# Patient Record
Sex: FEMALE | Race: WHITE | Employment: UNEMPLOYED | URBAN - METROPOLITAN AREA
[De-identification: names, ages, dates, MRNs, and addresses within clinical notes are randomized per-mention and may not be internally consistent; named-entity substitution may affect disease eponyms.]

---

## 2017-10-26 PROCEDURE — 87624 HPV HI-RISK TYP POOLED RSLT: CPT | Performed by: FAMILY MEDICINE

## 2017-10-26 PROCEDURE — 88175 CYTOPATH C/V AUTO FLUID REDO: CPT | Performed by: FAMILY MEDICINE

## 2020-01-12 PROBLEM — F43.9 SITUATIONAL STRESS: Status: ACTIVE | Noted: 2020-01-12

## 2020-01-12 PROBLEM — Z82.49 FAMILY HISTORY OF EARLY CAD: Status: ACTIVE | Noted: 2020-01-12

## 2024-11-26 ENCOUNTER — ANESTHESIA (OUTPATIENT)
Dept: ENDOSCOPY | Facility: HOSPITAL | Age: 54
DRG: 193 | End: 2024-11-26
Payer: COMMERCIAL

## 2024-11-26 ENCOUNTER — ANESTHESIA EVENT (OUTPATIENT)
Dept: ENDOSCOPY | Facility: HOSPITAL | Age: 54
DRG: 193 | End: 2024-11-26
Payer: COMMERCIAL

## 2024-11-26 ENCOUNTER — HOSPITAL ENCOUNTER (INPATIENT)
Facility: HOSPITAL | Age: 54
LOS: 4 days | Discharge: HOME | DRG: 193 | End: 2024-11-30
Attending: INTERNAL MEDICINE | Admitting: INTERNAL MEDICINE
Payer: COMMERCIAL

## 2024-11-26 ENCOUNTER — APPOINTMENT (OUTPATIENT)
Dept: GENERAL RADIOLOGY | Facility: HOSPITAL | Age: 54
DRG: 193 | End: 2024-11-26
Attending: EMERGENCY MEDICINE
Payer: COMMERCIAL

## 2024-11-26 PROBLEM — J96.90 RESPIRATORY FAILURE (HCC): Status: ACTIVE | Noted: 2024-11-26

## 2024-11-26 LAB
ADENOVIRUS PCR:: NOT DETECTED
ALBUMIN SERPL-MCNC: 4.4 G/DL (ref 3.2–4.8)
ALBUMIN/GLOB SERPL: 1.9 {RATIO} (ref 1–2)
ALP LIVER SERPL-CCNC: 91 U/L
ALT SERPL-CCNC: 15 U/L
ANION GAP SERPL CALC-SCNC: 8 MMOL/L (ref 0–18)
AST SERPL-CCNC: 27 U/L (ref ?–34)
B PARAPERT DNA SPEC QL NAA+PROBE: NOT DETECTED
B PERT DNA SPEC QL NAA+PROBE: NOT DETECTED
B-HCG SERPL-ACNC: <2.6 MIU/ML
BASOPHILS NFR BRONCH: 0 %
BILIRUB SERPL-MCNC: 0.8 MG/DL (ref 0.3–1.2)
BUN BLD-MCNC: 11 MG/DL (ref 9–23)
C PNEUM DNA SPEC QL NAA+PROBE: NOT DETECTED
CALCIUM BLD-MCNC: 9.9 MG/DL (ref 8.7–10.4)
CHLORIDE SERPL-SCNC: 105 MMOL/L (ref 98–112)
CO2 SERPL-SCNC: 24 MMOL/L (ref 21–32)
COLOR FLD: COLORLESS
CORONAVIRUS 229E PCR:: NOT DETECTED
CORONAVIRUS HKU1 PCR:: NOT DETECTED
CORONAVIRUS NL63 PCR:: NOT DETECTED
CORONAVIRUS OC43 PCR:: NOT DETECTED
CREAT BLD-MCNC: 0.82 MG/DL
EGFRCR SERPLBLD CKD-EPI 2021: 86 ML/MIN/1.73M2 (ref 60–?)
EOSINOPHIL NFR BRONCH: 2 %
ERYTHROCYTE [DISTWIDTH] IN BLOOD BY AUTOMATED COUNT: 12.1 %
EST. AVERAGE GLUCOSE BLD GHB EST-MCNC: 123 MG/DL (ref 68–126)
FLUAV RNA SPEC QL NAA+PROBE: NOT DETECTED
FLUBV RNA SPEC QL NAA+PROBE: NOT DETECTED
GLOBULIN PLAS-MCNC: 2.3 G/DL (ref 2–3.5)
GLUCOSE BLD-MCNC: 108 MG/DL (ref 70–99)
GLUCOSE BLD-MCNC: 97 MG/DL (ref 70–99)
GLUCOSE BLD-MCNC: 99 MG/DL (ref 70–99)
HBA1C MFR BLD: 5.9 % (ref ?–5.7)
HCT VFR BLD AUTO: 44.3 %
HGB BLD-MCNC: 14.8 G/DL
INR BLD: 0.98 (ref 0.8–1.2)
L PNEUMO AG UR QL: NEGATIVE
LYMPHOCYTES NFR BRONCH: 64 %
MCH RBC QN AUTO: 31.4 PG (ref 26–34)
MCHC RBC AUTO-ENTMCNC: 33.4 G/DL (ref 31–37)
MCV RBC AUTO: 94.1 FL
METAPNEUMOVIRUS PCR:: NOT DETECTED
MONOS+MACROS NFR BRONCH: 28 %
MRSA DNA SPEC QL NAA+PROBE: NEGATIVE
MYCOPLASMA PNEUMONIA PCR:: NOT DETECTED
NEUTROPHILS NFR BRONCH: 6 %
NT-PROBNP SERPL-MCNC: <35 PG/ML (ref ?–125)
OSMOLALITY SERPL CALC.SUM OF ELEC: 283 MOSM/KG (ref 275–295)
PARAINFLUENZA 1 PCR:: NOT DETECTED
PARAINFLUENZA 2 PCR:: NOT DETECTED
PARAINFLUENZA 3 PCR:: NOT DETECTED
PARAINFLUENZA 4 PCR:: NOT DETECTED
PLATELET # BLD AUTO: 262 10(3)UL (ref 150–450)
POTASSIUM SERPL-SCNC: 4.5 MMOL/L (ref 3.5–5.1)
PROCALCITONIN SERPL-MCNC: <0.04 NG/ML (ref ?–0.05)
PROT SERPL-MCNC: 6.7 G/DL (ref 5.7–8.2)
PROTHROMBIN TIME: 13 SECONDS (ref 11.6–14.8)
RBC # BLD AUTO: 4.71 X10(6)UL
RHINOVIRUS/ENTERO PCR:: NOT DETECTED
RSV RNA SPEC QL NAA+PROBE: NOT DETECTED
SARS-COV-2 RNA NPH QL NAA+NON-PROBE: NOT DETECTED
SODIUM SERPL-SCNC: 137 MMOL/L (ref 136–145)
STREP PNEUMO ANTIGEN, URINE: NEGATIVE
TOTAL CELLS COUNTED FLD: 100
TROPONIN I SERPL HS-MCNC: <3 NG/L
WBC # BLD AUTO: 6.2 X10(3) UL (ref 4–11)

## 2024-11-26 PROCEDURE — 0B9D8ZX DRAINAGE OF RIGHT MIDDLE LUNG LOBE, VIA NATURAL OR ARTIFICIAL OPENING ENDOSCOPIC, DIAGNOSTIC: ICD-10-PCS | Performed by: INTERNAL MEDICINE

## 2024-11-26 PROCEDURE — 5A0945Z ASSISTANCE WITH RESPIRATORY VENTILATION, 24-96 CONSECUTIVE HOURS: ICD-10-PCS | Performed by: HOSPITALIST

## 2024-11-26 PROCEDURE — 99291 CRITICAL CARE FIRST HOUR: CPT | Performed by: EMERGENCY MEDICINE

## 2024-11-26 PROCEDURE — 71045 X-RAY EXAM CHEST 1 VIEW: CPT | Performed by: EMERGENCY MEDICINE

## 2024-11-26 RX ORDER — ACETAMINOPHEN 500 MG
500 TABLET ORAL EVERY 4 HOURS PRN
Status: DISCONTINUED | OUTPATIENT
Start: 2024-11-26 | End: 2024-11-30

## 2024-11-26 RX ORDER — ONDANSETRON 2 MG/ML
4 INJECTION INTRAMUSCULAR; INTRAVENOUS EVERY 6 HOURS PRN
Status: DISCONTINUED | OUTPATIENT
Start: 2024-11-26 | End: 2024-11-26 | Stop reason: HOSPADM

## 2024-11-26 RX ORDER — METFORMIN HYDROCHLORIDE EXTENDED-RELEASE TABLETS 1000 MG/1
2000 TABLET, FILM COATED, EXTENDED RELEASE ORAL
COMMUNITY

## 2024-11-26 RX ORDER — ACETAMINOPHEN 10 MG/ML
1000 INJECTION, SOLUTION INTRAVENOUS ONCE
Status: COMPLETED | OUTPATIENT
Start: 2024-11-26 | End: 2024-11-26

## 2024-11-26 RX ORDER — PROCHLORPERAZINE EDISYLATE 5 MG/ML
5 INJECTION INTRAMUSCULAR; INTRAVENOUS EVERY 8 HOURS PRN
Status: DISCONTINUED | OUTPATIENT
Start: 2024-11-26 | End: 2024-11-26 | Stop reason: HOSPADM

## 2024-11-26 RX ORDER — LIDOCAINE HYDROCHLORIDE 20 MG/ML
INJECTION, SOLUTION INFILTRATION; PERINEURAL
Status: DISCONTINUED | OUTPATIENT
Start: 2024-11-26 | End: 2024-11-26 | Stop reason: HOSPADM

## 2024-11-26 RX ORDER — NICOTINE POLACRILEX 4 MG
15 LOZENGE BUCCAL
Status: DISCONTINUED | OUTPATIENT
Start: 2024-11-26 | End: 2024-11-30

## 2024-11-26 RX ORDER — NICOTINE POLACRILEX 4 MG
30 LOZENGE BUCCAL
Status: DISCONTINUED | OUTPATIENT
Start: 2024-11-26 | End: 2024-11-30

## 2024-11-26 RX ORDER — ENOXAPARIN SODIUM 100 MG/ML
40 INJECTION SUBCUTANEOUS DAILY
Status: DISCONTINUED | OUTPATIENT
Start: 2024-11-26 | End: 2024-11-30

## 2024-11-26 RX ORDER — DEXTROSE MONOHYDRATE 25 G/50ML
50 INJECTION, SOLUTION INTRAVENOUS
Status: DISCONTINUED | OUTPATIENT
Start: 2024-11-26 | End: 2024-11-30

## 2024-11-26 RX ORDER — SODIUM CHLORIDE, SODIUM LACTATE, POTASSIUM CHLORIDE, CALCIUM CHLORIDE 600; 310; 30; 20 MG/100ML; MG/100ML; MG/100ML; MG/100ML
INJECTION, SOLUTION INTRAVENOUS CONTINUOUS
Status: DISCONTINUED | OUTPATIENT
Start: 2024-11-26 | End: 2024-11-26

## 2024-11-26 RX ORDER — SODIUM CHLORIDE, SODIUM LACTATE, POTASSIUM CHLORIDE, CALCIUM CHLORIDE 600; 310; 30; 20 MG/100ML; MG/100ML; MG/100ML; MG/100ML
INJECTION, SOLUTION INTRAVENOUS CONTINUOUS PRN
Status: DISCONTINUED | OUTPATIENT
Start: 2024-11-26 | End: 2024-11-26 | Stop reason: SURG

## 2024-11-26 RX ORDER — LIDOCAINE HYDROCHLORIDE 10 MG/ML
INJECTION, SOLUTION EPIDURAL; INFILTRATION; INTRACAUDAL; PERINEURAL AS NEEDED
Status: DISCONTINUED | OUTPATIENT
Start: 2024-11-26 | End: 2024-11-26 | Stop reason: SURG

## 2024-11-26 RX ORDER — HYDROMORPHONE HYDROCHLORIDE 1 MG/ML
0.4 INJECTION, SOLUTION INTRAMUSCULAR; INTRAVENOUS; SUBCUTANEOUS EVERY 5 MIN PRN
Status: DISCONTINUED | OUTPATIENT
Start: 2024-11-26 | End: 2024-11-26 | Stop reason: HOSPADM

## 2024-11-26 RX ORDER — HYDROMORPHONE HYDROCHLORIDE 1 MG/ML
0.6 INJECTION, SOLUTION INTRAMUSCULAR; INTRAVENOUS; SUBCUTANEOUS EVERY 5 MIN PRN
Status: DISCONTINUED | OUTPATIENT
Start: 2024-11-26 | End: 2024-11-26 | Stop reason: HOSPADM

## 2024-11-26 RX ORDER — NALOXONE HYDROCHLORIDE 0.4 MG/ML
0.08 INJECTION, SOLUTION INTRAMUSCULAR; INTRAVENOUS; SUBCUTANEOUS AS NEEDED
Status: DISCONTINUED | OUTPATIENT
Start: 2024-11-26 | End: 2024-11-26 | Stop reason: HOSPADM

## 2024-11-26 RX ORDER — HYDROMORPHONE HYDROCHLORIDE 1 MG/ML
0.2 INJECTION, SOLUTION INTRAMUSCULAR; INTRAVENOUS; SUBCUTANEOUS EVERY 5 MIN PRN
Status: DISCONTINUED | OUTPATIENT
Start: 2024-11-26 | End: 2024-11-26 | Stop reason: HOSPADM

## 2024-11-26 RX ADMIN — LIDOCAINE HYDROCHLORIDE 50 MG: 10 INJECTION, SOLUTION EPIDURAL; INFILTRATION; INTRACAUDAL; PERINEURAL at 15:23:00

## 2024-11-26 RX ADMIN — SODIUM CHLORIDE, SODIUM LACTATE, POTASSIUM CHLORIDE, CALCIUM CHLORIDE: 600; 310; 30; 20 INJECTION, SOLUTION INTRAVENOUS at 15:21:00

## 2024-11-26 NOTE — ANESTHESIA PREPROCEDURE EVALUATION
PRE-OP EVALUATION    Patient Name: Virginia Mark    Admit Diagnosis: Bilateral pneumonia  Respiratory failure (HCC)    Pre-op Diagnosis: inpatient    BRONCHOSCOPY WITH BRONCHOALVEOLAR LAVAGE    Anesthesia Procedure: BRONCHOSCOPY WITH BRONCHOALVEOLAR LAVAGE    Surgeons and Role:     * Chuck Justice MD - Primary    Pre-op vitals reviewed.  Temp: 97.9 °F (36.6 °C)  Pulse: 80  Resp: 20  BP: 113/75  SpO2: 97 %  Body mass index is 27.04 kg/m².    Current medications reviewed.  Hospital Medications:   influenza virus trivalent PF (Fluzone Trivalent) 0.5 mL IM injection (ages 6 months to 64 years) 0.5 mL  0.5 mL Intramuscular Prior to discharge    enoxaparin (Lovenox) 40 MG/0.4ML SUBQ injection 40 mg  40 mg Subcutaneous Daily    acetaminophen (Tylenol Extra Strength) tab 500 mg  500 mg Oral Q4H PRN    glucose (Dex4) 15 GM/59ML oral liquid 15 g  15 g Oral Q15 Min PRN    Or    glucose (Glutose) 40% oral gel 15 g  15 g Oral Q15 Min PRN    Or    glucose-vitamin C (Dex-4) chewable tab 4 tablet  4 tablet Oral Q15 Min PRN    Or    dextrose 50% injection 50 mL  50 mL Intravenous Q15 Min PRN    Or    glucose (Dex4) 15 GM/59ML oral liquid 30 g  30 g Oral Q15 Min PRN    Or    glucose (Glutose) 40% oral gel 30 g  30 g Oral Q15 Min PRN    Or    glucose-vitamin C (Dex-4) chewable tab 8 tablet  8 tablet Oral Q15 Min PRN    insulin aspart (NovoLOG) 100 Units/mL FlexPen 1-5 Units  1-5 Units Subcutaneous TID AC and HS       Outpatient Medications:   Prescriptions Prior to Admission[1]    Allergies: Penicillins      Anesthesia Evaluation    Patient summary reviewed.    Anesthetic Complications           GI/Hepatic/Renal                                 Cardiovascular                                                       Endo/Other                                  Pulmonary               (+) shortness of breath            Neuro/Psych                                      Past Surgical History:   Procedure Laterality Date    Colposcopy,bx  cervix/endocerv curr      years ago nl    D & c            x2    Other surgical history      wisdom teeth     Social History     Socioeconomic History    Marital status:    Tobacco Use    Smoking status: Never    Smokeless tobacco: Never   Vaping Use    Vaping status: Never Used   Substance and Sexual Activity    Alcohol use: Yes     Alcohol/week: 0.0 standard drinks of alcohol     Comment: social    Drug use: No    Sexual activity: Yes     Partners: Male     Birth control/protection: Pill     History   Drug Use No     Available pre-op labs reviewed.  Lab Results   Component Value Date    WBC 6.2 2024    RBC 4.71 2024    HGB 14.8 2024    HCT 44.3 2024    MCV 94.1 2024    MCH 31.4 2024    MCHC 33.4 2024    RDW 12.1 2024    .0 2024               Airway      Mallampati: I  Mouth opening: >3 FB  TM distance: > 6 cm  Neck ROM: full Cardiovascular    Cardiovascular exam normal.  Rhythm: regular  Rate: normal     Dental             Pulmonary    Pulmonary exam normal.                 Other findings              ASA: 2   Plan: MAC  NPO status verified and patient meets guidelines.          Plan/risks discussed with: patient and significant other                Present on Admission:  **None**             [1]   Medications Prior to Admission   Medication Sig Dispense Refill Last Dose/Taking    metFORMIN HCl ER, OSM, 1000 MG (OSM) Oral Tablet 24 Hr Take 2 tablets (2,000 mg total) by mouth daily with breakfast. Only taking 1000 mg while on abx   2024    escitalopram 10 MG Oral Tab Take 1 tablet (10 mg total) by mouth daily. (Patient taking differently: Take 1.5 tablets (15 mg total) by mouth daily.) 90 tablet 1 2024    Norethindrone (BOB-BE) 0.35 MG Oral Tab Take 1 tablet (0.35 mg total) by mouth once daily. 84 tablet 4     Solifenacin Succinate 5 MG Oral Tab Take 1 tablet (5 mg total) by mouth daily. 90 tablet 1     busPIRone HCl 10 MG Oral Tab  Take 1 tablet (10 mg total) by mouth 2 (two) times daily. 180 tablet 0

## 2024-11-26 NOTE — CONSULTS
Attempted to see patient x 2 but she was down for bronchoscopy.  Case reviewed and d/w Dr. Maikel King.  At present observing off all antibiotics and will await cultures.  PCT negative.  Add full RVP to look for atypical pathogens (mycoplasma, chlamydia, etc.)  Full consult to follow.    Abby Sahu DO, FACOI

## 2024-11-26 NOTE — PLAN OF CARE
Pt direct admit from Dr. Iverson's office around 1030. Alert and oriented. Arrived on 6L, able to wean to 2L after bronch. NSR. VSS. Ambulated to bathroom with steady gait. See flowsheets for full assessments. POC discussed with pt and  bedside.       Discussed R/O TB with critical care MD - no need for airborne iso at this time.

## 2024-11-26 NOTE — CONSULTS
HISTORY OF PRESENT ILLNESS       Virginia Mark is a 54 year old female with a history of anxiety who presented to the [facility] 10/15/24 with complains of chest tightness for about 1.5 weeks. She was noted to have infiltrates on CXR and an elevated D Dimer. A CTA chest was done and was negative for PE but showed bilateral consolidations and pulmonary nodules. She was diagnosed with pneumonia and was discharged home on po cefdinir and azithromycin. She saw her PCP 10/25 and was apparently wheezing so he prescribed Proair.  She continued to be sick so her PCP then prescribed levaquin ~ 11/15/24. She saw her PCP again  and was still complaining of wheezing, cough, and chest tightness. He prescribed Airsupra and recommended pulmonary evaluation. She saw Dr. Iverson today and was directly admitted. We've been consulted to assist in the patient's care.     She tells me that she has a cough which is worse in the morning; it is sometimes productive of yellow phlegm. The cough is not very bad however. She denies hemoptysis, fevers/chills, night sweats, or weight loss. She has no prior history of lung disease. She denies sick contacts. She travelled to [place] and [place] in late September and did bathe in some [place] at that time. She also took a train ride where she was exposed to diesel fumes but she denies any other environmental exposures. She has pet dogs but has not had any other animal exposures. She is unaware of any TB exposures. She denies any history of autoimmune disease. She denies any new joint pains. She saw derm recently for some rash on her face but denies any other skin rashes.      PAST MEDICAL HISTORY     Past Medical History:    Anxiety       PAST SURGICAL HISTORY      Past Surgical History:   Procedure Laterality Date    Colposcopy,bx cervix/endocerv curr      years ago nl    D & c            x2    Other surgical history      wisdom teeth       HOME MEDICATIONS      Prior to Admission  Medications   Prescriptions Last Dose Informant Patient Reported? Taking?   Norethindrone (BOB-BE) 0.35 MG Oral Tab   No No   Sig: Take 1 tablet (0.35 mg total) by mouth once daily.   Solifenacin Succinate 5 MG Oral Tab   No No   Sig: Take 1 tablet (5 mg total) by mouth daily.   busPIRone HCl 10 MG Oral Tab   No No   Sig: Take 1 tablet (10 mg total) by mouth 2 (two) times daily.   escitalopram 10 MG Oral Tab 11/25/2024  No Yes   Sig: Take 1 tablet (10 mg total) by mouth daily.   Patient taking differently: Take 1.5 tablets (15 mg total) by mouth daily.   metFORMIN HCl ER, OSM, 1000 MG (OSM) Oral Tablet 24 Hr 11/25/2024  Yes Yes   Sig: Take 2 tablets (2,000 mg total) by mouth daily with breakfast. Only taking 1000 mg while on abx      Facility-Administered Medications: None       CURRENT MEDICATIONS       enoxaparin  40 mg Subcutaneous Daily    insulin aspart  1-5 Units Subcutaneous TID AC and HS       PRN meds:    influenza virus vaccine PF    acetaminophen    glucose **OR** glucose **OR** glucose-vitamin C **OR** dextrose **OR** glucose **OR** glucose **OR** glucose-vitamin C    Infusions:        ALLERGIES      Allergies[1]    SOCIAL HISTORY        Social History     Socioeconomic History    Marital status:      Spouse name: Not on file    Number of children: Not on file    Years of education: Not on file    Highest education level: Not on file   Occupational History    Not on file   Tobacco Use    Smoking status: Never    Smokeless tobacco: Never   Vaping Use    Vaping status: Never Used   Substance and Sexual Activity    Alcohol use: Yes     Alcohol/week: 0.0 standard drinks of alcohol     Comment: social    Drug use: No    Sexual activity: Yes     Partners: Male     Birth control/protection: Pill   Other Topics Concern    Not on file   Social History Narrative    Not on file     Social Drivers of Health     Financial Resource Strain: Not on file   Food Insecurity: No Food Insecurity (11/26/2024)    Food  Insecurity     Food Insecurity: Never true   Transportation Needs: No Transportation Needs (11/26/2024)    Transportation Needs     Lack of Transportation: No     Car Seat: Not on file   Physical Activity: Not on file   Stress: Not on file   Social Connections: Not on file   Housing Stability: Low Risk  (11/26/2024)    Housing Stability     Housing Instability: No     Housing Instability Emergency: Not on file     Crib or Bassinette: Not on file       FAMILY HISTORY      Family History   Problem Relation Age of Onset    Heart Disorder Father 70        Quadruple Bypass    Psychiatric Father         dementia    Diabetes Mother     Heart Disease Brother     Alcohol and Other Disorders Associated Brother     Diabetes Maternal Grandfather     Psychiatric Daughter         anxiety       REVIEW OF SYSTEMS      10 pt ROS negative except what is mentioned in HPI    OBJECTIVE      Vitals:    11/26/24 1034 11/26/24 1100   BP: 122/79    Pulse: 84    Resp: 18    Temp:  97.9 °F (36.6 °C)   TempSrc:  Temporal   SpO2: 93%    Weight: 162 lb 7.7 oz (73.7 kg)       O2: 6LPM    Gen - Alert, oriented x 3, in no apparent distress  HEENT - head normocephalic, atraumatic. Eyes-no scleral icterus or injection              - Mouth - moist mucus membranes, no oral lesions  Neck - supple, no palpable lymphadenopathy.   Lungs - scattered crackles heard bilaterally. No wheezing. No visible respiratory distress.   CV - regular rate & rhythm. Normal S1, S2. No murmurs, rubs, or gallops appreciated.  Abdomen - soft, nontender to palpation. No organomegaly appreciated.   Extremities - No cyanosis, clubbing, edema appreciated.      Labs:  Pending      Imaging reviewed.            ASSESSMENT AND PLAN     Acute hypoxemic respiratory failure - secondary to bilateral pneumonia   -continue supplemental oxygen   -antibiotics per ID  Multifocal pneumonia - symptom onset 1 month ago. Failed outpatient antibiotics (cefdinir, azithro, levaquin). CT scan done  10/15/24 shows bilateral peribronchial consolidations and lung nodules. Subsequent CXR's have worsened. DDx is broad at this point but includes bacterial pneumonia, atypical pneumonia, fungal pneumonia, viral disease, non-infectious pneumonitis / autoimmune disease. Malignancy seems less likely  -ID consult - defer antibiotics to them  -agree with lab testing to r/o autoimmune disease and fungal disease  -will plan bronchoscopy w/ BAL this afternoon for microbiologic testing  -patient will need follow up imaging once clinically improved  Nutrition  -NPO for bronch this afternoon  Dispo  -full code  -patient is currently in the ICU    We will continue to follow with you       Chuck Justice M.D.  Pulmonary/Critical Care         [1]   Allergies  Allergen Reactions    Penicillins RASH

## 2024-11-26 NOTE — CONSULTS
Critical Care Consultation - [facility]        HPI: Virginia Mark is a 54 year old female with a history of diabetes on metformin mild obesity who was up to usual state of health up until late September after a trip from [facility] specifically [place].  Patient had what appeared to be URI symptoms that resolved.  On returning to [place] patient started to have tightness in her chest went to an urgent care had a CAT scan done which was negative for PE but had bilateral consolidations and pulmonary nodules.  She is diagnosed with pneumonia and was treated with cefdinir and azithromycin.  Her symptoms continued to actually get worse now with cough and mild dyspnea on exertion and therefore saw her primary care doctor at the end of October and was started on another course of antibiotics it appears with Levaquin.  Her symptoms continued to get worse so therefore patient was seen in the pulmonologist office today where she was hypoxic.  I received a call from the pulmonologist for direct admit for further workup etc.  Patient when she arrived was not ill-appearing but was on 6 L nasal cannula was able to give a full history.  I subsequently spoke to the infectious disease doctor as well as a pulmonologist.  Full battery of tests are been ordered please see below            Past Medical History:  Past Medical History:    Anxiety     Past Surgical History:   Past Surgical History:   Procedure Laterality Date    Colposcopy,bx cervix/endocerv curr      years ago nl    D & c            x2    Other surgical history      wisdom teeth      Family History:   Family History   Problem Relation Age of Onset    Heart Disorder Father 70        Quadruple Bypass    Psychiatric Father         dementia    Diabetes Mother     Heart Disease Brother     Alcohol and Other Disorders Associated Brother     Diabetes Maternal Grandfather     Psychiatric Daughter         anxiety     Social History:    reports that she  has never smoked. She has never used smokeless tobacco. She reports current alcohol use. She reports that she does not use drugs.     HOME MEDICATIONS      Prior to Admission Medications   Prescriptions Last Dose Informant Patient Reported? Taking?   Norethindrone (BOB-BE) 0.35 MG Oral Tab   No No   Sig: Take 1 tablet (0.35 mg total) by mouth once daily.   Solifenacin Succinate 5 MG Oral Tab   No No   Sig: Take 1 tablet (5 mg total) by mouth daily.   busPIRone HCl 10 MG Oral Tab   No No   Sig: Take 1 tablet (10 mg total) by mouth 2 (two) times daily.   escitalopram 10 MG Oral Tab 11/25/2024  No Yes   Sig: Take 1 tablet (10 mg total) by mouth daily.   Patient taking differently: Take 1.5 tablets (15 mg total) by mouth daily.   metFORMIN HCl ER, OSM, 1000 MG (OSM) Oral Tablet 24 Hr 11/25/2024  Yes Yes   Sig: Take 2 tablets (2,000 mg total) by mouth daily with breakfast. Only taking 1000 mg while on abx      Facility-Administered Medications: None       CURRENT MEDICATIONS       enoxaparin  40 mg Subcutaneous Daily    insulin aspart  1-5 Units Subcutaneous TID AC and HS       PRN meds:    influenza virus vaccine PF    acetaminophen    glucose **OR** glucose **OR** glucose-vitamin C **OR** dextrose **OR** glucose **OR** glucose **OR** glucose-vitamin C    Infusions:        ALLERGIES      Allergies[1]      REVIEW OF SYSTEMS      10 pt ROS negative except what is mentioned in HPI    OBJECTIVE      Vitals:    11/26/24 1034 11/26/24 1100   BP: 122/79    Pulse: 84    Resp: 18    Temp:  97.9 °F (36.6 °C)   TempSrc:  Temporal   SpO2: 93%    Weight: 162 lb 7.7 oz (73.7 kg)                 Gen - Alert, oriented x 3, in no apparent distress  HEENT - head normocephalic, atraumatic. Eyes-no scleral icterus or injection              - Mouth - moist mucus membranes, no oral lesions  Neck - supple, no palpable lymphadenopathy.   Lungs - equal breath sounds bilaterally. No wheezing, crackles, rhonchi  CV - regular rate & rhythm.  Normal S1, S2. No murmurs, rubs, or gallops appreciated.  Abdomen - soft, nontender to palpation. No organomegaly appreciated. Normal bowel sounds.  Extremities - No cyanosis, clubbing, edema appreciated.      Labs:    No results for input(s): \"WBC\", \"HGB\", \"PLT\" in the last 168 hours.  No results for input(s): \"NA\", \"K\", \"CL\", \"CO2\", \"BUN\", \"CREATSERUM\", \"GLU\", \"ANIONGAP\", \"ALB\", \"CA\", \"MG\", \"PHOS\", \"ALKPHO\", \"AST\", \"ALT\", \"BILT\", \"TP\", \"LIP\", \"LACTI\" in the last 168 hours.  No results for input(s): \"PGLU\" in the last 168 hours.  No results for input(s): \"PT\", \"INR\" in the last 168 hours.  No results for input(s): \"TROP\", \"CK\", \"PBNP\", \"TROPHS\" in the last 168 hours.  No results for input(s): \"WIE\", \"LDH\", \"DDIMER\", \"CRP\", \"ESRML\", \"PCT\", \"NH3\" in the last 168 hours.  No results for input(s): \"ABGPHT\", \"FMBIBI3U\", \"QNOGX8U\", \"ABGHCO3\", \"ABGBE\", \"JJDD0XCW\", \"O2SATC\", \"LACTIART\" in the last 168 hours.  No results for input(s): \"IPH\", \"IPCO2\", \"IPO2\", \"ITCO2\", \"IHCO3\", \"ISO2\", \"MIRYAM\", \"IBGD\", \"IFIO2\" in the last 168 hours.  No results for input(s): \"COVID19\", \"INFAPCR\", \"INFBPCR\", \"RSV\", \"STREPPNEUMAG\", \"LEGIONAG\" in the last 168 hours.    Urinalysis:  No results for input(s): \"COLORUR\", \"CLARITY\", \"SPECGRAVITY\", \"PHURINE\", \"PROUR\", \"KETUR\", \"GLUUR\", \"BILUR\", \"BLOODURINE\", \"NITRITE\", \"UROBILINOGEN\", \"LEUUR\", \"UASA\", \"EPIUR\", \"WBCUR\", \"BACUR\" in the last 168 hours.     No results for input(s): \"ABGPHT\", \"RBHHQS2T\", \"FQLLU4G\", \"ABGHCO3\", \"ABGBE\", \"TEMP\", \"NEPTALI\", \"SITE\", \"DEV\", \"THGB\" in the last 168 hours.    Invalid input(s): \"EID96PGY\", \"CHOB\"      Imaging reviewed.    ASSESSMENT AND PLAN   54-year-old female admitted with impending respiratory failure    1.  Neurologic no encephalopathy or delirium no pain    2.  Pulmonary hypoxemic respiratory failure now appears to be with bilateral infiltrates especially in the setting of travel to the [place].  Will do full workup.  Chest x-ray here interpreted  has bilateral extensive infiltrates.  Bronchoscopy schedule either today or tomorrow  Full workup pending with respect to vasculitis workup infectious disease as well as fungal workup.  Currently on 6 L nasal cannula.  3.  Cardiovascular no signs of shock bedside echo preserved ejection fraction we will get official echo just for extensive workup  4.  GI no alla abdominal pathology based on exam  5.  Renal no signs of kidney injury  6.  Infectious disease no fever but relative leukopenia noted prior to arrival we will repeat labs here but will do full infectious workup including fungal workup as well as blood cultures as well as viral studies etc.  Moderate suspicion for valley fever although interestingly no fevers currently.  Infectious disease personally spoken to hold off on antibiotics for right now  7.  Hematology no significant anemia thrombocytopenia or coagulopathy continue DVT prophylaxis    8.  Endocrine history of diabetes on metformin continue low-dose sliding scale    DVT prophylaxis  No indication for GI prophylaxis  Disposition keep in intensive care unit today if stays stable post bronchoscopy potential transfer  35 minutes critical care time spent with this patient with respect to management of impending respiratory failure date of service 11/26/2024    Maikel King MD         [1]   Allergies  Allergen Reactions    Penicillins RASH

## 2024-11-26 NOTE — OPERATIVE REPORT
Virginia Mark Patient Status:  Hospital Outpatient Surgery    1970  MRN [id number]    Location [facility] ENDOSCOPY Attending Chuck Justice MD   Hosp Day # 0 PCP Tressa Hamilton DO      Bronchoscopy Procedure Note - [facility]    Procedure(s) performed:  Diagnostic Bronchoscopy  Right middle lobe BAL    Pre-Operative Diagnosis:   Abnormal imaging  Pneumonia    Post-Operative Diagnosis:   Same    Anesthesia:   Monitored Anesthesia Care (MAC)    Procedure Details:   The patient was consented for the procedure; the risks and benefits of the procedure were explained in detail.  Patient was given the opportunity to ask questions and all concerns were answered.  Patient was given IV sedation by the anesthesiologist.     After the patient was adequately sedated, the bronchoscope was inserted orally and advanced to the vocal cords. Topical lidocaine was administered onto the vocal cords. The scope was then advanced into the trachea, where more topical lidocaine was administered. The trachea was normal. The scope was advanced to the main munira and more topical lidocaine was administered onto the munira and bilateral mainstem bronchi. The munira was sharp and normal.     The scope was then advanced into the right mainstem bronchus and then to the right upper lobe, right bronchus intermedius, right middle lobe, and right lower lobe, which were normal. There were no obvious purulent airway secretions. The scope was advanced to the right middle lobe. Bronchoalveolar lavage was performed in the right middle lobe with 100cc of normal saline instilled into the airways and aspirated back.     The scope was then advanced into the left mainstem bronchus. The scope was advanced distally to the left upper lobe, lingular bronchus, and left lower lobe bronchi, which were normal. There were no obvious purulent airway secretions.    After airway inspection and right middle lobe BAL was performed, the scope  was removed. There were no complications. The patient tolerated the procedure well.     Estimated Blood Loss:    None           Specimens:   BAL from right middle lobe     Complications:    None; patient tolerated the procedure well.           Disposition:   Endoscopy Lab Recovery Room    Impression:  Normal airway inspection  Right middle lobe was performed for microbiologic sampling.    Recommendations:    Follow up bronchial cultures - sent for AFB, fungal, bacterial cultures, and cytology  Ok to resume diet from a pulmonary standpoint.       Chuck Justice M.D.  Pulmonary/Critical Care

## 2024-11-27 ENCOUNTER — APPOINTMENT (OUTPATIENT)
Dept: CV DIAGNOSTICS | Facility: HOSPITAL | Age: 54
DRG: 193 | End: 2024-11-27
Payer: COMMERCIAL

## 2024-11-27 LAB
ALBUMIN SERPL-MCNC: 4.2 G/DL (ref 3.2–4.8)
ALBUMIN/GLOB SERPL: 2.1 {RATIO} (ref 1–2)
ALP LIVER SERPL-CCNC: 82 U/L
ALT SERPL-CCNC: 15 U/L
ANION GAP SERPL CALC-SCNC: 5 MMOL/L (ref 0–18)
ANTI-MPO ANTIBODIES: <0.2 UNITS
ANTI-PR3 ANTIBODIES: <0.2 UNITS
AST SERPL-CCNC: 22 U/L (ref ?–34)
BASOPHILS # BLD AUTO: 0.03 X10(3) UL (ref 0–0.2)
BASOPHILS NFR BLD AUTO: 0.7 %
BILIRUB SERPL-MCNC: 1 MG/DL (ref 0.3–1.2)
BUN BLD-MCNC: 12 MG/DL (ref 9–23)
CALCIUM BLD-MCNC: 9.7 MG/DL (ref 8.7–10.4)
CHLORIDE SERPL-SCNC: 106 MMOL/L (ref 98–112)
CO2 SERPL-SCNC: 30 MMOL/L (ref 21–32)
CREAT BLD-MCNC: 0.79 MG/DL
EGFRCR SERPLBLD CKD-EPI 2021: 90 ML/MIN/1.73M2 (ref 60–?)
EOSINOPHIL # BLD AUTO: 0.14 X10(3) UL (ref 0–0.7)
EOSINOPHIL NFR BLD AUTO: 3.2 %
ERYTHROCYTE [DISTWIDTH] IN BLOOD BY AUTOMATED COUNT: 12.2 %
GLOBULIN PLAS-MCNC: 2 G/DL (ref 2–3.5)
GLUCOSE BLD-MCNC: 104 MG/DL (ref 70–99)
GLUCOSE BLD-MCNC: 105 MG/DL (ref 70–99)
GLUCOSE BLD-MCNC: 107 MG/DL (ref 70–99)
GLUCOSE BLD-MCNC: 88 MG/DL (ref 70–99)
GLUCOSE BLD-MCNC: 97 MG/DL (ref 70–99)
HCT VFR BLD AUTO: 41 %
HGB BLD-MCNC: 13.5 G/DL
IMM GRANULOCYTES # BLD AUTO: 0.01 X10(3) UL (ref 0–1)
IMM GRANULOCYTES NFR BLD: 0.2 %
INR BLD: 0.97 (ref 0.8–1.2)
LYMPHOCYTES # BLD AUTO: 0.88 X10(3) UL (ref 1–4)
LYMPHOCYTES NFR BLD AUTO: 19.9 %
M PNEUMO IGG ABS: <100 U/ML
M PNEUMO IGM ABS: <770 U/ML
M TB CMPLX RRNA SPEC QL PROBE: NOT DETECTED
MAGNESIUM SERPL-MCNC: 2.1 MG/DL (ref 1.6–2.6)
MCH RBC QN AUTO: 30.5 PG (ref 26–34)
MCHC RBC AUTO-ENTMCNC: 32.9 G/DL (ref 31–37)
MCV RBC AUTO: 92.8 FL
MONOCYTES # BLD AUTO: 0.67 X10(3) UL (ref 0.1–1)
MONOCYTES NFR BLD AUTO: 15.2 %
NEUTROPHILS # BLD AUTO: 2.69 X10 (3) UL (ref 1.5–7.7)
NEUTROPHILS # BLD AUTO: 2.69 X10(3) UL (ref 1.5–7.7)
NEUTROPHILS NFR BLD AUTO: 60.8 %
OSMOLALITY SERPL CALC.SUM OF ELEC: 292 MOSM/KG (ref 275–295)
PHOSPHATE SERPL-MCNC: 4.6 MG/DL (ref 2.4–5.1)
PLATELET # BLD AUTO: 262 10(3)UL (ref 150–450)
POTASSIUM SERPL-SCNC: 4.5 MMOL/L (ref 3.5–5.1)
PROT SERPL-MCNC: 6.2 G/DL (ref 5.7–8.2)
PROTHROMBIN TIME: 13 SECONDS (ref 11.6–14.8)
RBC # BLD AUTO: 4.42 X10(6)UL
SODIUM SERPL-SCNC: 141 MMOL/L (ref 136–145)
WBC # BLD AUTO: 4.4 X10(3) UL (ref 4–11)

## 2024-11-27 PROCEDURE — 99233 SBSQ HOSP IP/OBS HIGH 50: CPT | Performed by: EMERGENCY MEDICINE

## 2024-11-27 PROCEDURE — 93306 TTE W/DOPPLER COMPLETE: CPT

## 2024-11-27 NOTE — H&P
[facility] Hospitalist H&P       CC: No chief complaint on file.       PCP: Tressa Hamilton DO    History of Present Illness: Patient is a 54 year old female with PMH sig for generalized anxiety who is directly admitted for workup and evaluation of chest tightness and abnormal CT scan.  Her symptoms initially started after her return from her trip in September from [facility].  Visited duly [facility] at that time for shortness of breath, her dimer was elevated, her PE study was negative but her CT scan showed bilateral consolidation and pulmonary nodules.  She was diagnosed with pneumonia and treated with cefdinir and azithromycin.  Her symptoms continued to worsen and started to experience worsening shortness of breath with exertion.  She was again given another antibiotics.  Despite 2 different courses of antibiotic, her symptoms did not resolve and she visited pulmonologist.  At the pulmonologist office, she was hypoxic and was sent to the hospital for further evaluation and management.  Patient was directly admitted to the ICU, she was on 6 L of oxygen.  She underwent bronchoscopy shortly after arrival to the ICU.  I evaluated the patient postoperatively.    PMH  Past Medical History:    Anxiety        PSH  Past Surgical History:   Procedure Laterality Date    Colposcopy,bx cervix/endocerv curr      years ago nl    D & c            x2    Other surgical history      wisdom teeth        ALL:  Allergies[1]     Home Medications:  Medications Taking[2]      Soc Hx  Social History     Tobacco Use    Smoking status: Never    Smokeless tobacco: Never   Substance Use Topics    Alcohol use: Yes     Alcohol/week: 0.0 standard drinks of alcohol     Comment: social        Fam Hx  Family History   Problem Relation Age of Onset    Heart Disorder Father 70        Quadruple Bypass    Psychiatric Father         dementia    Diabetes Mother     Heart Disease Brother     Alcohol and Other Disorders Associated Brother     Diabetes  Maternal Grandfather     Psychiatric Daughter         anxiety       Review of Systems  Comprehensive ROS reviewed and negative except for what's stated above.     OBJECTIVE:  /59 (BP Location: Right arm)   Pulse 70   Temp 97.7 °F (36.5 °C) (Temporal)   Resp 17   Wt 160 lb 4.4 oz (72.7 kg)   SpO2 93%   BMI 26.67 kg/m²   General:  Alert, no distress   Head:  Normocephalic, without obvious abnormality, atraumatic.   Eyes:  Sclera anicteric, No conjunctival pallor, EOMs intact.    Nose: Nares normal. Septum midline. Mucosa normal. No drainage.   Throat: Lips, mucosa, and tongue normal. Teeth and gums normal.   Neck: Supple, symmetrical, trachea midline,   Lungs:   Elemental oxygen, diminished at the bases, intermittent cough   Chest wall:  No tenderness or deformity.   Heart:  Regular rate and rhythm, S1, S2 normal,   Abdomen:   Soft, non-tender. Bowel sounds normal.Non distended   Extremities: Extremities normal, atraumatic, no cyanosis or edema.   Skin: Skin color, texture, turgor normal. No rashes or lesions.    Neurologic: Normal strength, no focal deficit appreciated     Diagnostic Data:    CBC/Chem  Recent Labs   Lab 11/26/24  1433 11/26/24  1441 11/27/24  0436   WBC 6.2  --  4.4   HGB 14.8  --  13.5   MCV 94.1  --  92.8   .0  --  262.0   INR  --  0.98 0.97       Recent Labs   Lab 11/26/24  1433 11/27/24  0436    141   K 4.5 4.5    106   CO2 24.0 30.0   BUN 11 12   CREATSERUM 0.82 0.79   GLU 99 104*   CA 9.9 9.7   MG  --  2.1   PHOS  --  4.6       Recent Labs   Lab 11/26/24  1433 11/27/24  0436   ALT 15 15   AST 27 22   ALB 4.4 4.2       No results for input(s): \"TROP\" in the last 168 hours.    CXR: image personally reviewed     Radiology: XR CHEST AP PORTABLE  (CPT=71045)    Result Date: 11/26/2024  CONCLUSION:  Extensive patchy bilateral airspace opacities involving the lower lobes, lingula and right middle lobe.  The appearance is suggestive of multifocal pneumonia.   LOCATION:   [facility]      Dictated by (CST): Zully Ramachandran DO on 11/26/2024 at 12:06 PM     Finalized by (CST): Zully Ramachandran DO on 11/26/2024 at 12:07 PM          ASSESSMENT / PLAN:      Patient is a 54 year old female with PMH sig for generalized anxiety who is directly admitted for workup and evaluation of chest tightness and abnormal CT scan.    Multifocal pneumonia  Acute hypoxic  respiratory failure  - Failed p.o. antibiotics in the outpatient setting  - CT chest 10/15 showing bilateral peribronchial consolidations and lung nodules  - Possible etiologies include atypical pneumonia fungal pneumonia versus pneumonitis/autoimmune process  - Serology workup pending  - Status post bronchoscopy 10/26, cultures/studies pending  - Keep off antibiotics per ID for now  - Infectious disease, pulmonologist following  - Intensivist following, recommendations reviewed  - Wean O2 as tolerated      RASTA  - Continue Lexapro    FN:  - IVF: 0.9 NS  - Diet: General    DVT Prophy: SCDs, Lovenox  Atrophy: Ambulate as tolerated  Lines: PIV    Dispo: Per ICU  Outpatient records or previous hospital records reviewed.     Further recommendations pending patient's clinical course.  [facility] hospitalist to continue to follow patient while in house    Patient and/or patient's family given opportunity to ask questions and note understanding and agreeing with therapeutic plan as outlined    Thank You,  DO Tia [facility] Hospitalist  Answering Service number: [phone]         [1]   Allergies  Allergen Reactions    Penicillins RASH   [2]   Outpatient Medications Marked as Taking for the 11/26/24 encounter (Hospital Encounter)   Medication Sig Dispense Refill    metFORMIN HCl ER, OSM, 1000 MG (OSM) Oral Tablet 24 Hr Take 2 tablets (2,000 mg total) by mouth daily with breakfast. Only taking 1000 mg while on abx      escitalopram 10 MG Oral Tab Take 1 tablet (10 mg total) by mouth daily. (Patient taking differently: Take 1.5 tablets (15 mg total) by  mouth daily.) 90 tablet 1

## 2024-11-27 NOTE — CM/SW NOTE
Documentation for O2 sats: (RN to add a progress note with either o2 walk written out or flow sheet added to progress note )    SPO2% on Room Air at Rest:   SPO2% on Oxygen at Rest:   At rest oxygen flow (liters per minute):   SPO2% Ambulation on Oxygen:   Ambulation oxygen flow (liters per minute):        (Reminder: The \"at rest\" and \"while ambulating\" are required statements. If patient is non ambulatory you can use \"with exertion\" such as during a transfer to assess their O2 level)        MD to document AFTER O2 sats  I had a face to face visit with this patient and I evaluated the patient's O2 saturations.  The patient is mobile in their home and is in need of a portable oxygen tank.  The home oxygen will improve the patient's condition.      Once O2 sats and MD verbiage are completed and IF home O2 is indicated, SW to place MDO in Psychiatric and request MD to cosign as appropriate. (Dx: CHF)      Home O2 concentrator and portable tanks at  liters NC to be used  (frequency) for diagnosis of   Duration of treatment   Evaluate for conserving device.  Ordering MD:  JAMILAH #     Óscar Allen, MSN RN, CM  [phone]

## 2024-11-27 NOTE — PAYOR COMM NOTE
--------------  ADMISSION REVIEW     Payor: [facility] OUT OF STATE PPO  Subscriber #:  [id number]  Authorization Number: [id number]    Admit date: 11/26/24  Admit time: 10:24 AM      NURSING:    Pt direct admit from Dr. Iverson's office around 1030. Alert and oriented. Arrived on 6L, able to wean to 2L after bronch. NSR. VSS. Ambulated to bathroom with steady gait.        PULM:    Virginia Mark is a 54 year old female with a history of anxiety who presented to the [facility] 10/15/24 with complains of chest tightness for about 1.5 weeks. She was noted to have infiltrates on CXR and an elevated D Dimer. A CTA chest was done and was negative for PE but showed bilateral consolidations and pulmonary nodules. She was diagnosed with pneumonia and was discharged home on po cefdinir and azithromycin. She saw her PCP 10/25 and was apparently wheezing so he prescribed Proair.  She continued to be sick so her PCP then prescribed levaquin ~ 11/15/24. She saw her PCP again 11/22 and was still complaining of wheezing, cough, and chest tightness. He prescribed Airsupra and recommended pulmonary evaluation. She saw Dr. Iverson today and was directly admitted. We've been consulted to assist in the patient's care.      She tells me that she has a cough which is worse in the morning; it is sometimes productive of yellow phlegm. The cough is not very bad however. She denies hemoptysis, fevers/chills, night sweats, or weight loss. She has no prior history of lung disease. She denies sick contacts. She travelled to [place] and [place] in late September and did bathe in [place] at that time. She also took a train ride where she was exposed to diesel fumes but she denies any other environmental exposures. She has pet dogs but has not had any other animal exposures. She is unaware of any TB exposures. She denies any history of autoimmune disease. She denies any new joint pains. She saw derm recently for some rash on her face but denies  any other skin rashes.       Past Medical History       Past Medical History:    Anxiety         Past Surgical History       Past Surgical History:   Procedure    Colposcopy,bx cervix/endocerv curr     years ago nl    D & c         x2    Other surgical history     wisdom teeth           ASSESSMENT AND PLAN      Acute hypoxemic respiratory failure - secondary to bilateral pneumonia   -continue supplemental oxygen   -antibiotics per ID  Multifocal pneumonia - symptom onset 1 month ago. Failed outpatient antibiotics (cefdinir, azithro, levaquin). CT scan done 10/15/24 shows bilateral peribronchial consolidations and lung nodules. Subsequent CXR's have worsened. DDx is broad at this point but includes bacterial pneumonia, atypical pneumonia, fungal pneumonia, viral disease, non-infectious pneumonitis / autoimmune disease. Malignancy seems less likely  -ID consult - defer antibiotics to them  -agree with lab testing to r/o autoimmune disease and fungal disease  -will plan bronchoscopy w/ BAL this afternoon for microbiologic testing  -patient will need follow up imaging once clinically improved  Nutrition  -NPO for bronch this afternoon  Dispo  -full code  -patient is currently in the ICU    Critical Care Consultation       HPI: Virginia Mark is a 54 year old female with a history of diabetes on metformin mild obesity who was up to [facility] of health up until late September after a trip from [facility] specifically [place].  Patient had what appeared to be URI symptoms that resolved.  On returning to [place] patient started to have tightness in her chest went to an urgent care had a CAT scan done which was negative for PE but had bilateral consolidations and pulmonary nodules.  She is diagnosed with pneumonia and was treated with cefdinir and azithromycin.  Her symptoms continued to actually get worse now with cough and mild dyspnea on exertion and therefore saw her primary care doctor at the end of  October and was started on another course of antibiotics it appears with Levaquin.  Her symptoms continued to get worse so therefore patient was seen in the pulmonologist office today where she was hypoxic.  I received a call from the pulmonologist for direct admit for further workup etc.  Patient when she arrived was not ill-appearing but was on 6 L nasal cannula was able to give a full history.  I subsequently spoke to the infectious disease doctor as well as a pulmonologist.      ASSESSMENT AND PLAN   54-year-old female admitted with impending respiratory failure   1.  Neurologic no encephalopathy or delirium no pain   2.  Pulmonary hypoxemic respiratory failure now appears to be with bilateral infiltrates especially in the setting of travel to the [place].  Will do full workup.  Chest x-ray here interpreted has bilateral extensive infiltrates.  Bronchoscopy schedule either today or tomorrow  Full workup pending with respect to vasculitis workup infectious disease as well as fungal workup.  Currently on 6 L nasal cannula.  3.  Cardiovascular no signs of shock bedside echo preserved ejection fraction we will get official echo just for extensive workup  4.  GI no alla abdominal pathology based on exam  5.  Renal no signs of kidney injury  6.  Infectious disease no fever but relative leukopenia noted prior to arrival we will repeat labs here but will do full infectious workup including fungal workup as well as blood cultures as well as viral studies etc.  Moderate suspicion for valley fever although interestingly no fevers currently.  Infectious disease personally spoken to hold off on antibiotics for right now  7.  Hematology no significant anemia thrombocytopenia or coagulopathy continue DVT prophylaxis   8.  Endocrine history of diabetes on metformin continue low-dose sliding scale   DVT prophylaxis  No indication for GI prophylaxis  Disposition keep in intensive care unit today if stays stable  post bronchoscopy potential transfer    Bronchoscopy      Procedure(s) performed:  Diagnostic Bronchoscopy  Right middle lobe BAL     Pre-Operative Diagnosis:   Abnormal imaging  Pneumonia    Impression:  Normal airway inspection  Right middle lobe was performed for microbiologic sampling.     Recommendations:     Follow up bronchial cultures - sent for AFB, fungal, bacterial cultures, and cytology  Ok to resume diet from a pulmonary standpoint.     11/27:  NURSING:  Attempted pt on room air pt desat to 88%. Introduced incentive spirometer.   PULM:    Last 24hrs: No events overnight, resting comfortably, denies complaints     OBJECTIVE:  Vitals          Vitals:     11/27/24 0445 11/27/24 0500 11/27/24 0600 11/27/24 0800   BP:   111/80 96/63     BP Location:           Pulse: 78 79 70     Resp: 18 18 17     Temp:       97.7 °F (36.5 °C)   TempSrc:           SpO2: 93% 94% 95%     Weight:                    Oxygen Therapy  SpO2: 95 %  O2 Device: Nasal cannula  O2 Flow Rate (L/min): 1 L/min    Lungs: rales anteriorly and right posterior lung field  Heart: S1, S2 normal, no murmur, click, rub or gallop, regular rate and rhythm  Abdomen: soft, non-tender; bowel sounds normal; no masses,  no organomegaly  Extremities: extremities normal, atraumatic, no cyanosis or edema           Lab Results   Component Value Date     WBC 4.4 11/27/2024     HGB 13.5 11/27/2024     HCT 41.0 11/27/2024     .0 11/27/2024     CREATSERUM 0.79 11/27/2024     BUN 12 11/27/2024      11/27/2024     K 4.5 11/27/2024      11/27/2024     CO2 30.0 11/27/2024      (H) 11/27/2024     CA 9.7 11/27/2024     ALB 4.2 11/27/2024     ALKPHO 82 11/27/2024     BILT 1.0 11/27/2024     TP 6.2 11/27/2024     AST 22 11/27/2024     ALT 15 11/27/2024     INR 0.97 11/27/2024     MG 2.1 11/27/2024     PHOS 4.6 11/27/2024     Imaging: chest x-ray reviewed- [facility] basilar infiltrates noted     ASSESSMENT/PLAN:     Acute hypoxemic respiratory failure  - secondary to bilateral pneumonia   -continue supplemental oxygen , wean as tolerated  -antibiotics per ID- currently on hold  Multifocal pneumonia - symptom onset 1 month ago. Failed outpatient antibiotics (cefdinir, azithro, levaquin). CT scan done 10/15/24 shows bilateral peribronchial consolidations and lung nodules. Subsequent CXR's have worsened. DDx is broad at this point but includes bacterial pneumonia, atypical pneumonia, fungal pneumonia, viral disease, non-infectious pneumonitis / autoimmune disease. Malignancy seems less likely  -ID consult - awaiting eval  -s/p bronch cultures in process  -agree with lab testing to r/o autoimmune disease and fungal disease  -patient will need follow up imaging once clinically improved  Nutrition  -diet as tolerated  Dispo  -full code  -stable for transfer to the floor      MEDICATIONS ADMINISTERED IN LAST 1 DAY:  acetaminophen (Ofirmev) 10 mg/mL infusion premix 1,000 mg       Date Action Dose Route User    11/26/2024 1640 New Bag 1,000 mg Intravenous Lisa Dominguez, RN       enoxaparin (Lovenox) 40 MG/0.4ML SUBQ injection 40 mg       Date Action Dose Route User    11/27/2024 0819 Given 40 mg Subcutaneous (Left Lower Abdomen) Lisa Dominguez, RN          lactated ringers infusion       Date Action Dose Route User    11/26/2024 1521 New Bag (none) Intravenous Deo Galo MD           Vitals (last day)       Date/Time Temp Pulse Resp BP SpO2 Weight O2 Device O2 Flow Rate (L/min) [facility]    11/27/24 0800 97.7 °F (36.5 °C) -- -- -- -- -- -- -- VIOLA    11/27/24 0600 -- 70 17 96/63 95 % -- -- -- EG    11/27/24 0500 -- 79 18 111/80 94 % -- -- -- EG    11/27/24 0445 -- 78 18 -- 93 % -- -- -- EG    11/27/24 0442 -- 75 15 -- 88 % -- Nasal cannula 1 L/min EG    11/27/24 0435 -- 81 16 -- 94 % -- None (Room air) -- EG    11/27/24 0400 97.9 °F (36.6 °C) 66 18 98/69 95 % -- None (Room air) --     11/27/24 0300 -- 81 16 95/72 96 % -- -- --     11/27/24 0200 -- 68 15 94/66 95  % -- -- -- EG    11/27/24 0100 -- 69 16 88/63 95 % -- -- -- EG    11/27/24 0000 98 °F (36.7 °C) 76 17 94/59 96 % 160 lb 4.4 oz (72.7 kg) -- 1 L/min EG    11/26/24 2300 -- 83 20 106/76 93 % -- -- -- EG    11/26/24 2200 -- 85 18 96/62 96 % -- -- -- EG    11/26/24 2100 -- 87 29 111/71 93 % -- -- -- EG    11/26/24 2000 98.9 °F (37.2 °C) 92 21 116/81 92 % -- Nasal cannula 2 L/min EG    11/26/24 1900 -- 85 20 111/77 94 % -- -- -- EG    11/26/24 1800 -- 85 21 116/78 94 % -- -- 2 L/min VIOLA    11/26/24 1700 -- 80 21 114/73 95 % -- -- -- VIOLA    11/26/24 1646 97.7 °F (36.5 °C) 94 23 109/77 96 % -- Nasal cannula 3 L/min VIOLA    11/26/24 1600 -- 95 24 111/71 94 % -- -- -- BZ    11/26/24 1555 -- 116 25 101/73 92 % -- -- -- BZ    11/26/24 1550 -- 103 32 115/74 92 % -- -- -- BZ    11/26/24 1545 -- 108 18 -- 92 % -- -- -- BZ    11/26/24 1540 -- 125 20 109/90 94 % -- -- -- BZ    11/26/24 1535 -- 106 26 87/57 91 % -- -- -- BZ    11/26/24 1533 97.3 °F (36.3 °C) 96 16 87/57 92 % -- -- -- MARIA LUZ    11/26/24 1400 -- 80 20 113/75 97 % -- -- -- VIOLA    11/26/24 1300 -- 76 23 105/69 96 % -- -- -- VIOLA    11/26/24 1200 -- 92 26 122/86 94 % -- -- -- VIOLA    11/26/24 1100 97.9 °F (36.6 °C) 80 20 110/75 96 % -- Nasal cannula 6 L/min VIOLA    11/26/24 1034 -- 84 18 122/79 93 % 162 lb 7.7 oz (73.7 kg) Nasal cannula 6 L/min VIOLA

## 2024-11-27 NOTE — PROGRESS NOTES
[facility]   part of [facility]    Progress Note    Virginia Mark Patient Status:  Inpatient    1970 MRN [id number]   Location [facility] 4SW-A Attending Jeremías Iverson MD   Hosp Day # 1 PCP Tressa Hamilton,      Subjective:   Virginia Mark is a(n) 54 year old female admitted yesterday status post bronchoscopy with all studies pending currently on room air down from 6 L    Objective:   Blood pressure 103/59, pulse 70, temperature 97.7 °F (36.5 °C), temperature source Temporal, resp. rate 17, weight 160 lb 4.4 oz (72.7 kg), SpO2 93%, not currently breastfeeding.  Intake/Output:   Last 3 shifts: I/O last 3 completed shifts:  In: 860 [P.O.:360; I.V.:500]  Out: 1050 [Urine:1050]   This shift: I/O this shift:  In: -   Out: 700 [Urine:700]     Vent Settings:      Hemodynamic parameters (last 24 hours):      Scheduled Meds:    enoxaparin  40 mg Subcutaneous Daily    insulin aspart  1-5 Units Subcutaneous TID CC and HS       Continuous Infusions:         Results:   Lab Results   Component Value Date    WBC 4.4 2024    HGB 13.5 2024    HCT 41.0 2024    .0 2024    CREATSERUM 0.79 2024    BUN 12 2024     2024    K 4.5 2024     2024    CO2 30.0 2024     (H) 2024    CA 9.7 2024    ALB 4.2 2024    ALKPHO 82 2024    BILT 1.0 2024    TP 6.2 2024    AST 22 2024    ALT 15 2024    INR 0.97 2024    TSH 1.610 2021    MG 2.1 2024    PHOS 4.6 2024    TROPHS <3 2024       XR CHEST AP PORTABLE  (CPT=71045)    Result Date: 2024  CONCLUSION:  Extensive patchy bilateral airspace opacities involving the lower lobes, lingula and right middle lobe.  The appearance is suggestive of multifocal pneumonia.   LOCATION:  [facility]      Dictated by (CST): Zully Ramachandran DO on 2024 at 12:06 PM     Finalized by (CST): Zully Ramachandran DO on 2024 at 12:07  PM            Assessment & Plan:   54-year-old female admitted with impending respiratory failure     1.  Neurologic no encephalopathy or delirium no pain   2.  Pulmonary hypoxemic respiratory failure now appears to be with bilateral infiltrates.  Currently on room air weaned down from 6 L status post bronchoscopy yesterday most studies pending  3.  Cardiovascular no signs of shock bedside echo preserved ejection fraction we will get official echo just for extensive workup  4.  GI no alla abdominal pathology based on exam  5.  Renal no signs of kidney injury  6.  Infectious disease no fever no leukocytosis given travel history fairly complicated specially with 2 previous courses of community-acquired antibiotics.  Appreciate infectious disease recommendations monitor off antibiotics for now  All studies pending including vasculitis workup and infectious workup viral panel negative  7.  Hematology no significant anemia thrombocytopenia or coagulopathy continue DVT prophylaxis   8.  Endocrine history of diabetes on metformin continue low-dose sliding scale     DVT prophylaxis  No indication for GI prophylaxis  Disposition transfer to floor with favor keeping in hospital for the short-term for further clarity  35 minutes follow-up care time spent with this patient with respect to management of resolving respiratory failure date of service 11/27/2024        Maikel King MD  11/27/2024

## 2024-11-27 NOTE — CONSULTS
[facility]   part of [facility] Infectious Disease  Report of Consultation    Virginia Mark Patient Status:  Inpatient    1970 MRN [id number]   Location [facility] 4SW-A Attending Jeremías Iverson MD   Hosp Day # 1 PCP Tressa Hamilton,      Date of Admission:  2024  Date of Consult:  2024    Reason for Consultation:  Atypical pneumonia    History of Present Illness:  Virginia Mark is a a(n) 54 year old female being seen at your request regarding a concern for atypical pneumonia.  She initially presented to [facility] in October with c/o CP/SOB and noted to have pulmonary infiltrates and nodules on CT.  Patient was given cefdinir and azithromycin and some time later inhaled steroids were added.  As patient did not fully respond, levofloxacin was Rx 11/15/24.  Wheezing, SOB, and chest tightness persisted.    Patient was referred to pulmonary and saw Dr. Iverson on 24.  She was found to be hypoxic in office and was sent to the ED.  Patient without F/C.  No hemoptysis or drenching night sweats.  She has a h/o baseline low WBCs - HIV negative.  Patient also with travel to [place] and [place] in September.  No personal h/o AI conditions but daughter currently being w/u for POTS and MCTD.    Patient had a bronch yesterday with BAL.  Gross examination of airways was normal.    Multiple cultures and studies are pending.  She is being observed off antimicrobials.  We are asked to see and assist.    History:  Past Medical History:    Anxiety     Past Surgical History:   Procedure Laterality Date    Colposcopy,bx cervix/endocerv curr      years ago nl    D & c            x2    Other surgical history      wisdom teeth     Family History   Problem Relation Age of Onset    Heart Disorder Father 70        Quadruple Bypass    Psychiatric Father         dementia    Diabetes Mother     Heart Disease Brother     Alcohol and Other Disorders Associated Brother      Diabetes Maternal Grandfather     Psychiatric Daughter         anxiety      reports that she has never smoked. She has never used smokeless tobacco. She reports current alcohol use. She reports that she does not use drugs.    Allergies:  Allergies[1]    Medications:    Current Facility-Administered Medications:     escitalopram (Lexapro) tab 15 mg, 15 mg, Oral, Daily    influenza virus trivalent PF (Fluzone Trivalent) 0.5 mL IM injection (ages 6 months to 64 years) 0.5 mL, 0.5 mL, Intramuscular, Prior to discharge    enoxaparin (Lovenox) 40 MG/0.4ML SUBQ injection 40 mg, 40 mg, Subcutaneous, Daily    acetaminophen (Tylenol Extra Strength) tab 500 mg, 500 mg, Oral, Q4H PRN    glucose (Dex4) 15 GM/59ML oral liquid 15 g, 15 g, Oral, Q15 Min PRN **OR** glucose (Glutose) 40% oral gel 15 g, 15 g, Oral, Q15 Min PRN **OR** glucose-vitamin C (Dex-4) chewable tab 4 tablet, 4 tablet, Oral, Q15 Min PRN **OR** dextrose 50% injection 50 mL, 50 mL, Intravenous, Q15 Min PRN **OR** glucose (Dex4) 15 GM/59ML oral liquid 30 g, 30 g, Oral, Q15 Min PRN **OR** glucose (Glutose) 40% oral gel 30 g, 30 g, Oral, Q15 Min PRN **OR** glucose-vitamin C (Dex-4) chewable tab 8 tablet, 8 tablet, Oral, Q15 Min PRN    insulin aspart (NovoLOG) 100 Units/mL FlexPen 1-5 Units, 1-5 Units, Subcutaneous, TID CC and HS    Review of Systems:    Constitutional:  No fevers, chills, diaphoresis, weight changes.   HEENT:  No visual changes, oral ulcers, sore throat, difficulty swallowing.   Respiratory: Cough, chest tightness, SOB.   Cardiovascular: Negative for chest pain, palpitations, irregular heart beats.   Gastrointestinal:  No abdominal pain, nausea, vomiting, diarrhea, or constipation.   Genitourinary:  No dysuria, hematuria, urine urgency or frequency.   Integument/breast: Negative for rash, skin lesions, and pruritus.   Hematologic/lymphatic: Negative for easy bruising, bleeding, and lymphadenopathy.   Musculoskeletal: Negative for myalgias,  arthralgias, muscle weakness.   Neurological: No focal neurologic deficits, seizures, tremors.   Psych:  No h/o anxiety, depression, other psych d/o.   Endocrine: No history of of diabetes, thyroid disorder.    Remainder of 12 point review of systems otherwise negative.    Vital signs in last 24 hours:  Patient Vitals for the past 24 hrs:   BP Temp Temp src Pulse Resp SpO2 Weight   11/27/24 1200 113/79 98.2 °F (36.8 °C) Temporal 86 17 94 % --   11/27/24 0800 103/59 97.7 °F (36.5 °C) Temporal 70 17 93 % --   11/27/24 0700 103/60 -- -- 67 15 96 % --   11/27/24 0600 96/63 -- -- 70 17 95 % --   11/27/24 0500 111/80 -- -- 79 18 94 % --   11/27/24 0445 -- -- -- 78 18 93 % --   11/27/24 0442 -- -- -- 75 15 (!) 88 % --   11/27/24 0435 -- -- -- 81 16 94 % --   11/27/24 0400 98/69 97.9 °F (36.6 °C) Temporal 66 18 95 % --   11/27/24 0300 95/72 -- -- 81 16 96 % --   11/27/24 0200 94/66 -- -- 68 15 95 % --   11/27/24 0100 (!) 88/63 -- -- 69 16 95 % --   11/27/24 0000 94/59 98 °F (36.7 °C) Temporal 76 17 96 % 160 lb 4.4 oz (72.7 kg)   11/26/24 2300 106/76 -- -- 83 20 93 % --   11/26/24 2200 96/62 -- -- 85 18 96 % --   11/26/24 2100 111/71 -- -- 87 (!) 29 93 % --   11/26/24 2000 116/81 98.9 °F (37.2 °C) Temporal 92 21 92 % --   11/26/24 1900 111/77 -- -- 85 20 94 % --   11/26/24 1800 116/78 -- -- 85 21 94 % --   11/26/24 1700 114/73 -- -- 80 21 95 % --   11/26/24 1646 109/77 97.7 °F (36.5 °C) Temporal 94 23 96 % --   11/26/24 1600 111/71 -- -- 95 24 94 % --   11/26/24 1555 101/73 -- -- 116 25 92 % --   11/26/24 1550 115/74 -- -- 103 (!) 32 92 % --   11/26/24 1545 -- -- -- 108 18 92 % --   11/26/24 1540 109/90 -- -- (!) 125 20 94 % --   11/26/24 1535 (!) 87/57 -- -- 106 26 91 % --   11/26/24 1533 (!) 87/57 97.3 °F (36.3 °C) -- 96 16 92 % --   11/26/24 1400 113/75 -- -- 80 20 97 % --       Intake/Output:  I/O this shift:  In: -   Out: 1350 [Urine:1350]    Physical Exam:   General: Awake, alert, non-tox and in NAD.   Head:  Normocephalic, without obvious abnormality, atraumatic.   Eyes: Conjunctivae/corneas clear.  No scleral icterus.  No conjunctival     hemorrhage.   Nose: Nares normal.   Throat:  Oropharynx clear, MMs moist.   Neck: Trachea ML, no masses.   Lungs: CTA b/l no rhonchi, rales, wheezes.   Chest wall: No tenderness or deformity.   Heart: Regular rate and rhythm, normal S1S2, no murmurs.   Abdomen: Soft, NT/ND.  Bowel sounds present.  No organomegaly.   Extremity: No edema.   Skin: No rashes or lesions.   Neurological: No focal neurologic deficits.    Lab Data Review:  Lab Results   Component Value Date    WBC 4.4 11/27/2024    HGB 13.5 11/27/2024    HCT 41.0 11/27/2024    .0 11/27/2024    CREATSERUM 0.79 11/27/2024    BUN 12 11/27/2024     11/27/2024    K 4.5 11/27/2024     11/27/2024    CO2 30.0 11/27/2024     11/27/2024    CA 9.7 11/27/2024    ALB 4.2 11/27/2024    ALKPHO 82 11/27/2024    BILT 1.0 11/27/2024    TP 6.2 11/27/2024    AST 22 11/27/2024    ALT 15 11/27/2024    INR 0.97 11/27/2024    MG 2.1 11/27/2024    PHOS 4.6 11/27/2024      Cultures:   Blood cultures negative  RVP negative  Bronch studies pending  MRSA negative  Histo/blasto/cocci pending  Fungal studies pending    Radiology:  CONCLUSION:    Extensive patchy bilateral airspace opacities involving the lower lobes, lingula and right middle lobe.  The appearance is suggestive of multifocal pneumonia.     Assessment and Plan:    Acute hypoxic respiratory failure with extensive patchy b/l airspace opacities noted and requiring supplemental O2  - No improvement with empiric courses of oral antibiotics over the last several weeks  - PCT negative  - RVP negative  - Multiple studies for atypical etiologies are pending  - Autoimmune studies pending  - Bronch cultures pending  - Monitoring off antibiotics for now    2.  Chronic leukopenia  - WBCs at 4.4K today and stable  - HIV negative    3.  Disposition - inpatient.  Supportive care  ongoing.  If any decompensation suggest empiric start of cefepime/doxycyline/fluconaozle while we await further lab studies and culture data.  Trending temps and WBCs.  Will follow with further recommendations.    Abby Sahu DOFormerly [facility]  [phone]    11/27/2024  1:00 PM         [1]   Allergies  Allergen Reactions    Penicillins RASH

## 2024-11-27 NOTE — PLAN OF CARE
Received pt alert and oriented x4, Ambulates to bathroom weaning oxygen, SR, tolerating po, no issues with with swallowing, Prn tylenol given for headache. Pt resting overnight.     Attempted pt on room air pt desat to 88%. Introduced incentive spirometer.

## 2024-11-27 NOTE — PROGRESS NOTES
Pulmonary Progress Note        NAME: Virginia Mark - ROOM: [address] [place] [address]-A - MRN: [id number] - Age: 54 year old - : 1970        Last 24hrs: No events overnight, resting comfortably, denies complaints    OBJECTIVE:  Vitals:    24 0445 24 0500 24 0600 24 0800   BP:  111/80 96/63    BP Location:       Pulse: 78 79 70    Resp: 18 18 17    Temp:    97.7 °F (36.5 °C)   TempSrc:       SpO2: 93% 94% 95%    Weight:           Oxygen Therapy  SpO2: 95 %  O2 Device: Nasal cannula  O2 Flow Rate (L/min): 1 L/min  Pulse Oximetry Type: Continuous  Oximetry Probe Site Changed: No  Pulse Ox Probe Location: Right hand                  Intake/Output Summary (Last 24 hours) at 2024 0830  Last data filed at 2024 2044  Gross per 24 hour   Intake 860 ml   Output 1050 ml   Net -190 ml       Scheduled Medication:   enoxaparin  40 mg Subcutaneous Daily    insulin aspart  1-5 Units Subcutaneous TID CC and HS     Continuous Infusing Medication:    Lungs: rales anteriorly and right posterior lung field  Heart: S1, S2 normal, no murmur, click, rub or gallop, regular rate and rhythm  Abdomen: soft, non-tender; bowel sounds normal; no masses,  no organomegaly  Extremities: extremities normal, atraumatic, no cyanosis or edema    Labs reviewed as noted below      Imaging: chest x-ray reviewed- edward basilar infiltrates noted    ASSESSMENT/PLAN:    Acute hypoxemic respiratory failure - secondary to bilateral pneumonia   -continue supplemental oxygen , wean as tolerated  -antibiotics per ID- currently on hold  Multifocal pneumonia - symptom onset 1 month ago. Failed outpatient antibiotics (cefdinir, azithro, levaquin). CT scan done 10/15/24 shows bilateral peribronchial consolidations and lung nodules. Subsequent CXR's have worsened. DDx is broad at this point but includes bacterial pneumonia, atypical pneumonia, fungal pneumonia, viral disease, non-infectious pneumonitis / autoimmune disease. Malignancy seems less  likely  -ID consult - awaiting eval  -s/p bronch cultures in process  -agree with lab testing to r/o autoimmune disease and fungal disease  -patient will need follow up imaging once clinically improved  Nutrition  -diet as tolerated  Dispo  -full code  -stable for transfer to the floor      Gino ReeceGarrett Parkmalachi  [facility]  Pulmonary and Critical Care

## 2024-11-27 NOTE — PLAN OF CARE
SPO2% on Room Air at Rest: 88%  SPO2% on Oxygen at Rest: 93%  At rest oxygen flow (liters per minute): 2L  SPO2% Ambulation on Oxygen: 88%  Ambulation oxygen flow (liters per minute): 3L   .

## 2024-11-28 LAB
ANION GAP SERPL CALC-SCNC: 5 MMOL/L (ref 0–18)
BASOPHILS # BLD AUTO: 0.05 X10(3) UL (ref 0–0.2)
BASOPHILS NFR BLD AUTO: 1 %
BUN BLD-MCNC: 16 MG/DL (ref 9–23)
CALCIUM BLD-MCNC: 9.6 MG/DL (ref 8.7–10.4)
CHLORIDE SERPL-SCNC: 106 MMOL/L (ref 98–112)
CO2 SERPL-SCNC: 28 MMOL/L (ref 21–32)
CREAT BLD-MCNC: 0.89 MG/DL
EGFRCR SERPLBLD CKD-EPI 2021: 78 ML/MIN/1.73M2 (ref 60–?)
EOSINOPHIL # BLD AUTO: 0.24 X10(3) UL (ref 0–0.7)
EOSINOPHIL NFR BLD AUTO: 4.9 %
ERYTHROCYTE [DISTWIDTH] IN BLOOD BY AUTOMATED COUNT: 12 %
GLUCOSE BLD-MCNC: 102 MG/DL (ref 70–99)
GLUCOSE BLD-MCNC: 105 MG/DL (ref 70–99)
GLUCOSE BLD-MCNC: 161 MG/DL (ref 70–99)
GLUCOSE BLD-MCNC: 245 MG/DL (ref 70–99)
GLUCOSE BLD-MCNC: 250 MG/DL (ref 70–99)
HCT VFR BLD AUTO: 41 %
HGB BLD-MCNC: 13.6 G/DL
IMM GRANULOCYTES # BLD AUTO: 0.02 X10(3) UL (ref 0–1)
IMM GRANULOCYTES NFR BLD: 0.4 %
INR BLD: 1.01 (ref 0.8–1.2)
LYMPHOCYTES # BLD AUTO: 0.83 X10(3) UL (ref 1–4)
LYMPHOCYTES NFR BLD AUTO: 17.1 %
MCH RBC QN AUTO: 31 PG (ref 26–34)
MCHC RBC AUTO-ENTMCNC: 33.2 G/DL (ref 31–37)
MCV RBC AUTO: 93.4 FL
MONOCYTES # BLD AUTO: 0.7 X10(3) UL (ref 0.1–1)
MONOCYTES NFR BLD AUTO: 14.4 %
NEUTROPHILS # BLD AUTO: 3.01 X10 (3) UL (ref 1.5–7.7)
NEUTROPHILS # BLD AUTO: 3.01 X10(3) UL (ref 1.5–7.7)
NEUTROPHILS NFR BLD AUTO: 62.2 %
OSMOLALITY SERPL CALC.SUM OF ELEC: 290 MOSM/KG (ref 275–295)
PLATELET # BLD AUTO: 244 10(3)UL (ref 150–450)
POTASSIUM SERPL-SCNC: 4.4 MMOL/L (ref 3.5–5.1)
PROTHROMBIN TIME: 13.4 SECONDS (ref 11.6–14.8)
RBC # BLD AUTO: 4.39 X10(6)UL
SODIUM SERPL-SCNC: 139 MMOL/L (ref 136–145)
WBC # BLD AUTO: 4.9 X10(3) UL (ref 4–11)

## 2024-11-28 PROCEDURE — 99233 SBSQ HOSP IP/OBS HIGH 50: CPT | Performed by: EMERGENCY MEDICINE

## 2024-11-28 RX ORDER — METHYLPREDNISOLONE SODIUM SUCCINATE 125 MG/2ML
60 INJECTION INTRAMUSCULAR; INTRAVENOUS EVERY 6 HOURS SCHEDULED
Status: DISCONTINUED | OUTPATIENT
Start: 2024-11-28 | End: 2024-11-30

## 2024-11-28 NOTE — PROGRESS NOTES
[facility]   part of [facility] Infectious Disease  Progress Note    Virginia Mark Patient Status:  Inpatient    1970 MRN [id number]   Location [facility] 4NW-A Attending Jeremías Iverson MD   Hosp Day # 2 PCP Tressa Hamilton,      Subjective:  Patient seen/examined.  Up in bed in NAD.  No F/C.  No new issues.  Continues to have SOB.    Objective:  Blood pressure 103/80, pulse 117, temperature 98 °F (36.7 °C), temperature source Oral, resp. rate 19, weight 160 lb 4.4 oz (72.7 kg), SpO2 92%, not currently breastfeeding.    Intake/Output:    Intake/Output Summary (Last 24 hours) at 2024 1748  Last data filed at 2024 1623  Gross per 24 hour   Intake 900 ml   Output 1700 ml   Net -800 ml       Physical Exam:  General: Awake, alert, non-tox, NAD.  HEENT:  Oropharynx clear, trachea ML.  Heart: RRR S1S2 no murmurs.  Lungs: Essentially CTA b/l, no rhonchi, rales, wheezes.  Abdomen: Soft, NT/ND.  BS present.  No organomegaly.  Extremity: No edema.  Neurological: No focal deficits.  Derm:  Warm, dry, free from rashes.    Lab Data Review:  Lab Results   Component Value Date    WBC 4.9 2024    HGB 13.6 2024    HCT 41.0 2024    .0 2024    CREATSERUM 0.89 2024    BUN 16 2024     2024    K 4.4 2024     2024    CO2 28.0 2024     2024    CA 9.6 2024    INR 1.01 2024        Cultures:   Blood cultures negative  RVP negative  Bronch studies pending  MRSA negative  Histo/blasto/cocci pending  Fungal studies pending    Radiology:  CONCLUSION:    Extensive patchy bilateral airspace opacities involving the lower lobes, lingula and right middle lobe.  The appearance is suggestive of multifocal pneumonia.      Assessment and Plan:     Acute hypoxic respiratory failure with extensive patchy b/l airspace opacities noted and requiring supplemental O2  - No improvement with empiric  courses of oral antibiotics over the last several weeks  - PCT negative  - RVP negative  - Multiple studies for atypical etiologies are pending  - Autoimmune studies pending, PANCA, CANCA negative  - Bronch cultures unrevealing thus far  - Monitoring off antibiotics for now     2.  Chronic leukopenia  - WBCs at 4.9K today and stable  - HIV negative     3.  Disposition - inpatient.  Supportive care ongoing.  If any decompensation suggest empiric start of cefepime/doxycyline/fluconaozle while we await further lab studies and culture data.  Trending temps and WBCs.  Will follow with further recommendations.    Abby Sahu DO, [facility] Infectious Disease  [phone]    11/28/2024  5:48 PM

## 2024-11-28 NOTE — PLAN OF CARE
Pt received from the ICU. A&Ox4. VSS maintaining in the 90s on RA. Afebrile. No c/o pain. All meds per MAR. IV steroids. PIV saline locked per orders. QID accucheck. Up ambulating in room. Family at bedside. Isolation precautions maintained. Call light within reach.    Problem: RESPIRATORY - ADULT  Goal: Achieves optimal ventilation and oxygenation  Description: INTERVENTIONS:  - Assess for changes in respiratory status  - Assess for changes in mentation and behavior  - Position to facilitate oxygenation and minimize respiratory effort  - Oxygen supplementation based on oxygen saturation or ABGs  - Provide Smoking Cessation handout, if applicable  - Encourage broncho-pulmonary hygiene including cough, deep breathe, Incentive Spirometry  - Assess the need for suctioning and perform as needed  - Assess and instruct to report SOB or any respiratory difficulty  - Respiratory Therapy support as indicated  - Manage/alleviate anxiety  - Monitor for signs/symptoms of CO2 retention  Outcome: Progressing     Problem: Diabetes/Glucose Control  Goal: Glucose maintained within prescribed range  Description: INTERVENTIONS:  - Monitor Blood Glucose as ordered  - Assess for signs and symptoms of hyperglycemia and hypoglycemia  - Administer ordered medications to maintain glucose within target range  - Assess barriers to adequate nutritional intake and initiate nutrition consult as needed  - Instruct patient on self management of diabetes  Outcome: Progressing

## 2024-11-28 NOTE — PLAN OF CARE
Assumed care 0730.  Alert and oriented x4,  Accu checks  Reg diet  PIV Saline Locked,   1L NC- IV Solu Medrol. IS encouraged. Wean O2.  Tele- NSR  Afebrile  Lovenox for VTE prophylaxis.   Denies pain.   Up at [facility]  Electrolyte Cardiac protocol.   Transfer Orders.   Continue to monitor pt.       Problem: RESPIRATORY - ADULT  Goal: Achieves optimal ventilation and oxygenation  Description: INTERVENTIONS:  - Assess for changes in respiratory status  - Assess for changes in mentation and behavior  - Position to facilitate oxygenation and minimize respiratory effort  - Oxygen supplementation based on oxygen saturation or ABGs  - Provide Smoking Cessation handout, if applicable  - Encourage broncho-pulmonary hygiene including cough, deep breathe, Incentive Spirometry  - Assess the need for suctioning and perform as needed  - Assess and instruct to report SOB or any respiratory difficulty  - Respiratory Therapy support as indicated  - Manage/alleviate anxiety  - Monitor for signs/symptoms of CO2 retention  Outcome: Progressing

## 2024-11-28 NOTE — PROGRESS NOTES
[facility] Hospitalist Progress Note     CC: Hospital Follow up    PCP: Tressa Hamilton DO       Assessment/Plan:     Active Problems:    Respiratory failure (HCC)          Patient is a 54 year old female with PMH sig for generalized anxiety who is directly admitted for workup and evaluation of chest tightness and abnormal CT scan.    Multifocal pneumonia  Acute hypoxic  respiratory failure  - Failed p.o. antibiotics in the outpatient setting  - CT chest 10/15 showing bilateral peribronchial consolidations and lung nodules  - Possible etiologies include atypical pneumonia fungal pneumonia versus pneumonitis/autoimmune process  - Serology workup pending  - Status post bronchoscopy 10/26, cultures/studies pending  - Keep off antibiotics per ID for now  - Infectious disease, pulmonologist following  - Intensivist following, Discussed, ok to transfer out of icu  - Wean O2 as tolerated    RASTA  - Continue Lexapro    FN:  - IVF: 0.9 NS  - Diet: General    DVT Prophy: SCDs, Lovenox  Atrophy: Ambulate as tolerated  Lines: PIV    Dispo: ok to transfer out of icu  Outpatient records or previous hospital records reviewed.     Further recommendations pending patient's clinical course.  [facility] hospitalist to continue to follow patient while in house    Patient and/or patient's family given opportunity to ask questions and note understanding and agreeing with therapeutic plan as outlined    Thank You,  DO Tai Zurita Hospitalist  Answering Service number: [phone]     Subjective:     No CP, or N/V. No new symptoms. Still with intermittent coughing.     OBJECTIVE:    Blood pressure 115/73, pulse 98, temperature 98.4 °F (36.9 °C), temperature source Temporal, resp. rate (!) 31, weight 160 lb 4.4 oz (72.7 kg), SpO2 90%, not currently breastfeeding.    Temp:  [97.4 °F (36.3 °C)-98.4 °F (36.9 °C)] 98.4 °F (36.9 °C)  Pulse:  [66-98] 98  Resp:  [15-31] 31  BP: ()/(59-80) 115/73  SpO2:  [88 %-96 %] 90  %      Intake/Output:    Intake/Output Summary (Last 24 hours) at 11/27/2024 2312  Last data filed at 11/27/2024 2045  Gross per 24 hour   Intake 360 ml   Output 2350 ml   Net -1990 ml       Last 3 Weights   11/27/24 0000 160 lb 4.4 oz (72.7 kg)   11/26/24 1034 162 lb 7.7 oz (73.7 kg)   03/23/22 0951 178 lb (80.7 kg)   02/04/22 1403 182 lb 6 oz (82.7 kg)       Exam   Gen: No acute distress, alert and oriented x3, no focal neurologic deficits  Heent: NC AT, mucous memb clear, neck supple  Pulm: diminished, supplemental O2, intermittent cough  CV: Heart with regular rate and rhythm, no peripheral edema  Abd: Abdomen soft, nontender, nondistended, bowel sounds present  MSK: Full range of motion in extremities, no clubbing, no cyanosis  Skin: no rashes or lesions  Neuro: AO*3, motor intact, no sensory deficits  Psyc: appropriate mood and affect      Data Review:       Labs:     Recent Labs   Lab 11/26/24  1433 11/27/24  0436   RBC 4.71 4.42   HGB 14.8 13.5   HCT 44.3 41.0   MCV 94.1 92.8   MCH 31.4 30.5   MCHC 33.4 32.9   RDW 12.1 12.2   NEPRELIM  --  2.69   WBC 6.2 4.4   .0 262.0         Recent Labs   Lab 11/26/24  1433 11/27/24  0436   GLU 99 104*   BUN 11 12   CREATSERUM 0.82 0.79   EGFRCR 86 90   CA 9.9 9.7    141   K 4.5 4.5    106   CO2 24.0 30.0       Recent Labs   Lab 11/26/24  1433 11/27/24  0436   ALT 15 15   AST 27 22   ALB 4.4 4.2         Imaging:  XR CHEST AP PORTABLE  (CPT=71045)    Result Date: 11/26/2024  CONCLUSION:  Extensive patchy bilateral airspace opacities involving the lower lobes, lingula and right middle lobe.  The appearance is suggestive of multifocal pneumonia.   LOCATION:  [facility]      Dictated by (CST): Zully Ramachandran DO on 11/26/2024 at 12:06 PM     Finalized by (CST): Zully Ramachandran DO on 11/26/2024 at 12:07 PM          Meds:      escitalopram  15 mg Oral Daily    enoxaparin  40 mg Subcutaneous Daily    insulin aspart  1-5 Units Subcutaneous TID CC and HS          influenza virus vaccine PF    acetaminophen    glucose **OR** glucose **OR** glucose-vitamin C **OR** dextrose **OR** glucose **OR** glucose **OR** glucose-vitamin C

## 2024-11-28 NOTE — PROGRESS NOTES
CC: follow-up hospital admission hypoxia    SUBJECTIVE:  Interval History:    Up in chair, feels sob just from the exertion     OBJECTIVE:  Scheduled Meds:    methylPREDNISolone  60 mg Intravenous 4 times per day    escitalopram  15 mg Oral Daily    enoxaparin  40 mg Subcutaneous Daily    insulin aspart  1-5 Units Subcutaneous TID CC and HS     Continuous Infusions:   PRN Meds:   influenza virus vaccine PF    acetaminophen    glucose **OR** glucose **OR** glucose-vitamin C **OR** dextrose **OR** glucose **OR** glucose **OR** glucose-vitamin C    PHYSICAL EXAM  Vital signs: Temp:  [97.4 °F (36.3 °C)-98.9 °F (37.2 °C)] 98.6 °F (37 °C)  Pulse:  [69-98] 96  Resp:  [16-31] 17  BP: (109-128)/(64-78) 128/78  SpO2:  [90 %-95 %] 94 %      GENERAL - NAD, AAO  EYES- sclera anicteric,    HENT- normocephalic,    NECK - no JVD  CV- RRR  RESP - decreased at bases, normal resp effort  ABDOMEN- soft, NT/ND,   EXT- no LE edema   PSYCH - normal mentation/ normal affect    Data Review:   Labs:   Recent Labs   Lab 11/26/24  1433 11/26/24  1441 11/27/24  0436 11/28/24  0426   WBC 6.2  --  4.4 4.9   HGB 14.8  --  13.5 13.6   MCV 94.1  --  92.8 93.4   .0  --  262.0 244.0   INR  --  0.98 0.97 1.01       Recent Labs   Lab 11/26/24  1433 11/27/24  0436 11/28/24  0426    141 139   K 4.5 4.5 4.4    106 106   CO2 24.0 30.0 28.0   BUN 11 12 16   CREATSERUM 0.82 0.79 0.89   CA 9.9 9.7 9.6   MG  --  2.1  --    PHOS  --  4.6  --    GLU 99 104* 105*       Recent Labs   Lab 11/26/24  1433 11/27/24  0436   ALT 15 15   AST 27 22   ALB 4.4 4.2       Recent Labs   Lab 11/27/24  1159 11/27/24  1714 11/27/24  2037 11/28/24  0813 11/28/24  1157   PGLU 97 105* 88 102* 161*           ASSESSMENT/PLAN:  Patient is a 54 year old female with PMH sig for generalized anxiety who is directly admitted for workup and evaluation of chest tightness and abnormal CT scan.     Abnormal CXR  Acute hypoxic  respiratory failure  - Xray with patchy bl  opacities  - Failed several p.o. antibiotics in the outpatient setting  - CT chest 10/15 showing bilateral peribronchial consolidations and lung nodules  - Possible etiologies include atypical pneumonia fungal pneumonia versus pneumonitis/autoimmune process  - Serology workup pending  - Status post bronchoscopy 10/26, cultures/studies pending  - Keep off antibiotics per ID for now  - Infectious disease, pulmonologist following  - Wean O2 as tolerated     RASTA  - Continue Lexapro      Will continue to follow while hospitalized. Please page me or the on-call hospitalist with questions or concerns.    Raoul Lujan Hospitalist  [phone]  Answering Service: [phone]

## 2024-11-28 NOTE — PROGRESS NOTES
Pulmonary Progress Note        NAME: Virginia Mark - ROOM: [address] [place] [address]- - MRN: [id number] - Age: 54 year old - : 1970        Last 24hrs: No events overnight, resting comfortably, denies complaints, needed 2L at rest and 3L with activity yesterday per O2 notes    OBJECTIVE:  Vitals:    24 1200 24 1700 24 2034 24 0030   BP: 113/79 114/70 115/73 122/73   BP Location: Right arm  Right arm Right arm   Pulse: 86 86 98 69   Resp: 17 22 (!) 31 17   Temp: 98.2 °F (36.8 °C) 97.4 °F (36.3 °C) 98.4 °F (36.9 °C) 98.9 °F (37.2 °C)   TempSrc: Temporal Temporal Temporal Temporal   SpO2: 94% 94% 90% 95%   Weight:           Oxygen Therapy  SpO2: 95 %  O2 Device: Nasal cannula  O2 Flow Rate (L/min): 1 L/min  Pulse Oximetry Type: Continuous  Oximetry Probe Site Changed: No  Pulse Ox Probe Location: Right hand                  Intake/Output Summary (Last 24 hours) at 2024 0641  Last data filed at 2024  Gross per 24 hour   Intake 360 ml   Output 2350 ml   Net -1990 ml       Scheduled Medication:   escitalopram  15 mg Oral Daily    enoxaparin  40 mg Subcutaneous Daily    insulin aspart  1-5 Units Subcutaneous TID CC and HS     Continuous Infusing Medication:    Lungs: rales anterior - bilateral  Heart: S1, S2 normal, no murmur, click, rub or gallop, regular rate and rhythm  Abdomen: soft, non-tender; bowel sounds normal; no masses,  no organomegaly  Extremities: extremities normal, atraumatic, no cyanosis or edema    Labs reviewed as noted below      Imaging: chest x-ray reviewed- edward basilar infiltrates noted    ASSESSMENT/PLAN:    Acute hypoxemic respiratory failure - secondary to bilateral pneumonia   -continue supplemental oxygen , wean as tolerated  -antibiotics per ID- currently on hold  Multifocal pneumonia - symptom onset 1 month ago. Failed outpatient antibiotics (cefdinir, azithro, levaquin). CT scan done 10/15/24 shows bilateral peribronchial consolidations and lung nodules.  Subsequent CXR's have worsened. DDx is broad at this point but includes bacterial pneumonia, atypical pneumonia, fungal pneumonia, viral disease, non-infectious pneumonitis / autoimmune disease. Malignancy seems less likely  -ID consult - holding on abx  -s/p bronch cultures in process  -agree with lab testing to r/o autoimmune disease and fungal disease  -patient will need follow up imaging once clinically improved  -given lack of improvement will start IV steroids today for suspected inflammatory process  Nutrition  -diet as tolerated  Dispo  -full code  -stable for transfer to the floor      [facility]  Pulmonary and Critical Care

## 2024-11-28 NOTE — PLAN OF CARE
Assumed care of pt after RN report. Alert and oriented. 1L O2, see O2 walk note. NSR. VSS. See flowsheets for full assessments. Stable for tx out of ICU.

## 2024-11-28 NOTE — PROGRESS NOTES
[facility]   part of [facility]    Progress Note    Virginia Mark Patient Status:  Inpatient    1970 MRN [id number]   Location [facility] 4SW-A Attending Jeremías Iverson MD   Hosp Day # 2 PCP Tressa Hamilton DO     Subjective:   Virginia Mark is a(n) 54 year old female admitted yesterday status post bronchoscopy 2 days ago now down to 1 L started on steroids today    Objective:   Blood pressure 126/78, pulse 90, temperature 98.5 °F (36.9 °C), temperature source Temporal, resp. rate 23, weight 160 lb 4.4 oz (72.7 kg), SpO2 92%, not currently breastfeeding.  Intake/Output:   Last 3 shifts: I/O last 3 completed shifts:  In: 720 [P.O.:720]  Out: 3400 [Urine:3400]   This shift: I/O this shift:  In: -   Out: 400 [Urine:400]     Vent Settings:      Hemodynamic parameters (last 24 hours):      Scheduled Meds:    methylPREDNISolone  60 mg Intravenous 4 times per day    escitalopram  15 mg Oral Daily    enoxaparin  40 mg Subcutaneous Daily    insulin aspart  1-5 Units Subcutaneous TID CC and HS       Continuous Infusions:         Results:   Lab Results   Component Value Date    WBC 4.9 2024    HGB 13.6 2024    HCT 41.0 2024    .0 2024    CREATSERUM 0.89 2024    BUN 16 2024     2024    K 4.4 2024     2024    CO2 28.0 2024     (H) 2024    CA 9.6 2024    ALB 4.2 2024    ALKPHO 82 2024    BILT 1.0 2024    TP 6.2 2024    AST 22 2024    ALT 15 2024    INR 1.01 2024    TSH 1.610 2021    MG 2.1 2024    PHOS 4.6 2024    TROPHS <3 2024       XR CHEST AP PORTABLE  (CPT=71045)    Result Date: 2024  CONCLUSION:  Extensive patchy bilateral airspace opacities involving the lower lobes, lingula and right middle lobe.  The appearance is suggestive of multifocal pneumonia.   LOCATION:  [facility]      Dictated by (CST): Zully Ramachandran DO on 2024 at  12:06 PM     Finalized by (CST): Zully Ramachandran DO on 11/26/2024 at 12:07 PM            Assessment & Plan:   54-year-old female admitted with impending respiratory failure     1.  Neurologic no encephalopathy or delirium no pain   2.  Pulmonary hypoxemic respiratory failure now appears to be with bilateral infiltrates.  Currently on 1 L nasal cannula  Started on steroids this morning by pulmonary consultation  3.  Cardiovascular no signs of shock bedside echo preserved ejection fraction official echo no acute findings  4.  GI no alla abdominal pathology based on exam  5.  Renal no signs of kidney injury  6.  Infectious disease no fever no leukocytosis given travel history fairly complicated specially with 2 previous courses of community-acquired antibiotics.  Appreciate infectious disease recommendations monitor off antibiotics for now appreciate infectious disease consult  All studies pending including vasculitis workup and infectious workup viral panel negative  7.  Hematology no significant anemia thrombocytopenia or coagulopathy continue DVT prophylaxis   8.  Endocrine history of diabetes on metformin continue low-dose sliding scale     DVT prophylaxis  No indication for GI prophylaxis  Disposition transfer to floor   35 minutes follow-up care time spent with this patient with respect to management of resolving respiratory failure date of service 11/28/2024

## 2024-11-29 LAB
ANION GAP SERPL CALC-SCNC: 8 MMOL/L (ref 0–18)
ASPERGILLUS AG BAL/SERUM: 0.05 INDEX
BUN BLD-MCNC: 17 MG/DL (ref 9–23)
CALCIUM BLD-MCNC: 10.7 MG/DL (ref 8.7–10.4)
CHLORIDE SERPL-SCNC: 106 MMOL/L (ref 98–112)
CO2 SERPL-SCNC: 25 MMOL/L (ref 21–32)
CREAT BLD-MCNC: 0.82 MG/DL
DSDNA IGG SERPL IA-ACNC: 1.3 IU/ML
EGFRCR SERPLBLD CKD-EPI 2021: 86 ML/MIN/1.73M2 (ref 60–?)
ENA AB SER QL IA: 0.1 UG/L
ENA AB SER QL IA: NEGATIVE
ERYTHROCYTE [DISTWIDTH] IN BLOOD BY AUTOMATED COUNT: 11.9 %
GLUCOSE BLD-MCNC: 122 MG/DL (ref 70–99)
GLUCOSE BLD-MCNC: 141 MG/DL (ref 70–99)
GLUCOSE BLD-MCNC: 148 MG/DL (ref 70–99)
GLUCOSE BLD-MCNC: 148 MG/DL (ref 70–99)
GLUCOSE BLD-MCNC: 171 MG/DL (ref 70–99)
HCT VFR BLD AUTO: 40.7 %
HGB BLD-MCNC: 14 G/DL
INR BLD: 0.96 (ref 0.8–1.2)
MAGNESIUM SERPL-MCNC: 2 MG/DL (ref 1.6–2.6)
MCH RBC QN AUTO: 31 PG (ref 26–34)
MCHC RBC AUTO-ENTMCNC: 34.4 G/DL (ref 31–37)
MCV RBC AUTO: 90 FL
OSMOLALITY SERPL CALC.SUM OF ELEC: 292 MOSM/KG (ref 275–295)
PLATELET # BLD AUTO: 315 10(3)UL (ref 150–450)
POTASSIUM SERPL-SCNC: 4.3 MMOL/L (ref 3.5–5.1)
PROTHROMBIN TIME: 12.9 SECONDS (ref 11.6–14.8)
RBC # BLD AUTO: 4.52 X10(6)UL
SODIUM SERPL-SCNC: 139 MMOL/L (ref 136–145)
WBC # BLD AUTO: 10.4 X10(3) UL (ref 4–11)

## 2024-11-29 RX ORDER — FAMOTIDINE 20 MG/1
20 TABLET, FILM COATED ORAL NIGHTLY
Status: DISCONTINUED | OUTPATIENT
Start: 2024-11-29 | End: 2024-11-30

## 2024-11-29 NOTE — SPIRITUAL CARE NOTE
Spiritual Care Visit Note    Patient Name: Virginia Mark Date of Spiritual Care Visit: 24   : 1970 Primary Dx: <principal problem not specified>       Referred By:      Spiritual Care Taxonomy:    Intended Effects: Demonstrate caring and concern    Methods: Collaborate with care team member;Explore spiritual/Hinduism beliefs    Interventions: Active listening;Ask guided questions;Explain  role    Visit Type/Summary:     - Spiritual Care: Consulted with RN prior to visit. Offered empathic listening and emotional support. Provided information regarding how to contact Spiritual Care and left a Spiritual Care information card.  remains available as needed for follow up. Patient stated that her Hinduism alla is Presybeterian. Patient also stated that she has a  and two adult children.     Spiritual Care support can be requested via an Epic consult. For urgent/immediate needs, please contact the On Call  at: Ángel: ext [phone]     Chaplain Resident Sruthi Stiles MA

## 2024-11-29 NOTE — PROGRESS NOTES
[facility]   part of [facility] Infectious Disease Progress Note    Virginia Mark Patient Status:  Inpatient    1970 MRN [id number]   Location [facility] 4NW-A Attending Jeremías Iverson MD   Hosp Day # 3 PCP Tressa Hamilton DO     Subjective:  Chart reviewed, no acute events.  Pt seen bedside with  present. She is feeling better overall.  Now on RA but does still desat with activity. Reports some chest tightness with coughing and deep breaths but this is also improved.  No f/c.  No other complaints.     Objective:    Allergies:  Allergies[1]    Medications:    Current Facility-Administered Medications:     methylPREDNISolone sodium succinate (Solu-MEDROL) injection 60 mg, 60 mg, Intravenous, 4 times per day    escitalopram (Lexapro) tab 15 mg, 15 mg, Oral, Daily    influenza virus trivalent PF (Fluzone Trivalent) 0.5 mL IM injection (ages 6 months to 64 years) 0.5 mL, 0.5 mL, Intramuscular, Prior to discharge    enoxaparin (Lovenox) 40 MG/0.4ML SUBQ injection 40 mg, 40 mg, Subcutaneous, Daily    acetaminophen (Tylenol Extra Strength) tab 500 mg, 500 mg, Oral, Q4H PRN    glucose (Dex4) 15 GM/59ML oral liquid 15 g, 15 g, Oral, Q15 Min PRN **OR** glucose (Glutose) 40% oral gel 15 g, 15 g, Oral, Q15 Min PRN **OR** glucose-vitamin C (Dex-4) chewable tab 4 tablet, 4 tablet, Oral, Q15 Min PRN **OR** dextrose 50% injection 50 mL, 50 mL, Intravenous, Q15 Min PRN **OR** glucose (Dex4) 15 GM/59ML oral liquid 30 g, 30 g, Oral, Q15 Min PRN **OR** glucose (Glutose) 40% oral gel 30 g, 30 g, Oral, Q15 Min PRN **OR** glucose-vitamin C (Dex-4) chewable tab 8 tablet, 8 tablet, Oral, Q15 Min PRN    insulin aspart (NovoLOG) 100 Units/mL FlexPen 1-5 Units, 1-5 Units, Subcutaneous, TID CC and HS    Physical Exam:  General: Alert, orientated x3.  Cooperative.  No apparent distress.  Vital Signs:  Blood pressure 123/87, pulse 90, temperature 98.5 °F (36.9 °C), temperature source Oral, resp. rate 16,  weight 160 lb 4.4 oz (72.7 kg), SpO2 92%, not currently breastfeeding.   Temp (24hrs), Av.4 °F (36.9 °C), Min:98 °F (36.7 °C), Max:98.6 °F (37 °C)      HEENT: Exam is unremarkable.  Without scleral icterus.  Mucous membranes are moist. PERRLA.  Oropharynx is clear.  Lungs: Clear to auscultation bilaterally. No w/r.  Non labored.   Cardiac: Regular rate and rhythm. No murmur.  Abdomen:  Soft, non-distended, non-tender, with no rebound or guarding.    Extremities:  No lower extremity edema noted.  Without clubbing or cyanosis.    Skin: Normal texture and turgor.  Neurologic: Cranial nerves are grossly intact.      Labs:  Lab Results   Component Value Date    WBC 10.4 2024    HGB 14.0 2024    HCT 40.7 2024    .0 2024    CREATSERUM 0.82 2024    BUN 17 2024     2024    K 4.3 2024     2024    CO2 25.0 2024     2024    CA 10.7 2024    INR 0.96 2024    MG 2.0 2024       Radiology:      Assessment/Plan:    1.  Acute hypoxic respiratory failure  -was on oral antibiotics over past few weeks  -admitted with hypoxia and requiring O2 supplementation  -imaging with patchy b/l airspace opacities  -afebrile  -now weaned to RA   -PCT negative  -RVP negative  -strep pneumo and legionella ag negative  -MRSA PCR negative  -s/p bronch on , cultures with NF,  fungal NGTD, MTB complex negative  -aspergillus negative   -Fungal ab, pneumocystis, blastomyces, histoplasma, coccidiodes all pending  -pulmonary following, on steroids   -following off antibiotics    2. Chronic leukopenia  -HIV neg  -WBC at 10K today (also on steroids)   -afebrile    DISPO:  Continue to follow off antibiotics.  If clinically worsens will empirically add PO antibiotics.   Follow up all pending cultures/labs.  Stable for DC once cleared by pulmonary/primary and can f/u all results as an outpatient.  D/w patient and  bedside.      If you have  any questions or concerns please call [facility] at [phone].     WILFREDO Kovacs  11/29/2024  8:20 AM         [1]   Allergies  Allergen Reactions    Penicillins RASH

## 2024-11-29 NOTE — PROGRESS NOTES
CC: follow-up hospital admission hypoxia    SUBJECTIVE:  Interval History:    Feels better,  offO2 now but requires this with exertion    OBJECTIVE:  Scheduled Meds:    methylPREDNISolone  60 mg Intravenous 4 times per day    escitalopram  15 mg Oral Daily    enoxaparin  40 mg Subcutaneous Daily    insulin aspart  1-5 Units Subcutaneous TID CC and HS     Continuous Infusions:   PRN Meds:   influenza virus vaccine PF    acetaminophen    glucose **OR** glucose **OR** glucose-vitamin C **OR** dextrose **OR** glucose **OR** glucose **OR** glucose-vitamin C    PHYSICAL EXAM  Vital signs: Temp:  [97.9 °F (36.6 °C)-98.5 °F (36.9 °C)] 97.9 °F (36.6 °C)  Pulse:  [] 78  Resp:  [16-19] 16  BP: (103-132)/(73-87) 132/80  SpO2:  [92 %-94 %] 94 %      GENERAL - NAD, AAO  EYES- sclera anicteric,    HENT- normocephalic,    NECK - supple  CV- RRR  RESP - decreased at bases, normal resp effort  ABDOMEN- soft, NT/ND,   EXT- no LE edema   PSYCH - normal mentation/ normal affect    Data Review:   Labs:   Recent Labs   Lab 11/26/24  1433 11/26/24  1441 11/27/24  0436 11/28/24  0426 11/29/24  0524   WBC 6.2  --  4.4 4.9 10.4   HGB 14.8  --  13.5 13.6 14.0   MCV 94.1  --  92.8 93.4 90.0   .0  --  262.0 244.0 315.0   INR  --  0.98 0.97 1.01 0.96       Recent Labs   Lab 11/26/24  1433 11/27/24  0436 11/28/24  0426 11/29/24  0524    141 139 139   K 4.5 4.5 4.4 4.3    106 106 106   CO2 24.0 30.0 28.0 25.0   BUN 11 12 16 17   CREATSERUM 0.82 0.79 0.89 0.82   CA 9.9 9.7 9.6 10.7*   MG  --  2.1  --  2.0   PHOS  --  4.6  --   --    GLU 99 104* 105* 148*       Recent Labs   Lab 11/26/24  1433 11/27/24  0436   ALT 15 15   AST 27 22   ALB 4.4 4.2       Recent Labs   Lab 11/28/24  1157 11/28/24  1621 11/28/24  2034 11/29/24  0610 11/29/24  1342   PGLU 161* 250* 245* 141* 122*           ASSESSMENT/PLAN:  Patient is a 54 year old female with PMH sig for generalized anxiety who is directly admitted for workup and evaluation of  chest tightness and abnormal CT scan.     Abnormal CXR  Acute hypoxic  respiratory failure  - Xray with patchy bl opacities  - Failed several p.o. antibiotics in the outpatient setting  - CT chest 10/15 showing bilateral peribronchial consolidations and lung nodules  - Possible etiologies include atypical pneumonia fungal pneumonia versus pneumonitis/autoimmune process  - Serology workup pending  - Status post bronchoscopy 10/26, cultures/studies pending  - Keep off antibiotics per ID for now  - Infectious disease, pulmonologist following  - on IV steroids  - Wean O2 as tolerated     RASTA  - Continue Lexapro    Dispo - per pulm  Will continue to follow while hospitalized. Please page me or the on-call hospitalist with questions or concerns.    Raoul Lujan Hospitalist  [phone]  Answering Service: [phone]

## 2024-11-29 NOTE — PAYOR COMM NOTE
--------------  CONTINUED STAY REVIEW    Payor: [employer] P[employer]  Subscriber #:  [id number]  Authorization Number: [id number]    Admit date: 11/26/24  Admit time: 10:24 AM    REVIEW DOCUMENTATION: 11/28    Raoul Heard DO   Physician  Hospitalist     Progress Notes     Signed     Date of Service: 11/28/2024 12:50 PM     Signed       Expand All Collapse All[]Expand All by Default    CC: follow-up hospital admission hypoxia     SUBJECTIVE:  Interval History:     Up in chair, feels sob just from the exertion      OBJECTIVE:  Scheduled Meds:   Scheduled Medications    methylPREDNISolone  60 mg Intravenous 4 times per day    escitalopram  15 mg Oral Daily    enoxaparin  40 mg Subcutaneous Daily    insulin aspart  1-5 Units Subcutaneous TID CC and HS         Continuous Infusions:   Medication Infusions        PRN Meds:   PRN Medications     influenza virus vaccine PF    acetaminophen    glucose **OR** glucose **OR** glucose-vitamin C **OR** dextrose **OR** glucose **OR** glucose **OR** glucose-vitamin C        PHYSICAL EXAM  Vital signs: Temp:  [97.4 °F (36.3 °C)-98.9 °F (37.2 °C)] 98.6 °F (37 °C)  Pulse:  [69-98] 96  Resp:  [16-31] 17  BP: (109-128)/(64-78) 128/78  SpO2:  [90 %-95 %] 94 %        GENERAL - NAD, AAO  EYES- sclera anicteric,    HENT- normocephalic,    NECK - no JVD  CV- RRR  RESP - decreased at bases, normal resp effort  ABDOMEN- soft, NT/ND,   EXT- no LE edema   PSYCH - normal mentation/ normal affect     Data Review:   Labs:          Recent Labs   Lab 11/26/24  1433 11/26/24  1441 11/27/24  0436 11/28/24  0426   WBC 6.2  --  4.4 4.9   HGB 14.8  --  13.5 13.6   MCV 94.1  --  92.8 93.4   .0  --  262.0 244.0   INR  --  0.98 0.97 1.01               Recent Labs   Lab 11/26/24  1433 11/27/24  0436 11/28/24  0426    141 139   K 4.5 4.5 4.4    106 106   CO2 24.0 30.0 28.0   BUN 11 12 16   CREATSERUM 0.82 0.79 0.89   CA 9.9 9.7 9.6   MG  --  2.1  --    PHOS  --  4.6  --    GLU 99  104* 105*              Recent Labs   Lab 24  1433 24  0436   ALT 15 15   AST 27 22   ALB 4.4 4.2                 Recent Labs   Lab 24  1159 24  1714 24  2037 24  0813 24  1157   PGLU 97 105* 88 102* 161*               ASSESSMENT/PLAN:  Patient is a 54 year old female with PMH sig for generalized anxiety who is directly admitted for workup and evaluation of chest tightness and abnormal CT scan.     Abnormal CXR  Acute hypoxic  respiratory failure  - Xray with patchy bl opacities  - Failed several p.o. antibiotics in the outpatient setting  - CT chest 10/15 showing bilateral peribronchial consolidations and lung nodules  - Possible etiologies include atypical pneumonia fungal pneumonia versus pneumonitis/autoimmune process  - Serology workup pending  - Status post bronchoscopy 10/26, cultures/studies pending  - Keep off antibiotics per ID for now  - Infectious disease, pulmonologist following  - Wean O2 as tolerated     RASTA  - Continue Lexapro        Will continue to follow while hospitalized. Please page me or the on-call hospitalist with questions or concerns.     Raoul Heard  [facility] Hospitalist                  2024 Infectious Disease  [facility]   part of [facility] Infectious Disease Progress Note           Virginia Mark Patient Status:  Inpatient    1970 MRN [id number]   Location [facility] 4NW-A Attending Jeremías Iverson MD   Hosp Day # 3 PCP Tressa Hamilton DO      Subjective:  Chart reviewed, no acute events.  Pt seen bedside with  present. She is feeling better overall.  Now on RA but does still desat with activity. Reports some chest tightness with coughing and deep breaths but this is also improved.  No f/c.  No other complaints.      Objective:     Allergies:  [Allergies]    [Allergies]       Allergen Reactions    Penicillins RASH        Medications:    Current Hospital Medications      Current  Facility-Administered Medications:     methylPREDNISolone sodium succinate (Solu-MEDROL) injection 60 mg, 60 mg, Intravenous, 4 times per day    escitalopram (Lexapro) tab 15 mg, 15 mg, Oral, Daily    influenza virus trivalent PF (Fluzone Trivalent) 0.5 mL IM injection (ages 6 months to 64 years) 0.5 mL, 0.5 mL, Intramuscular, Prior to discharge    enoxaparin (Lovenox) 40 MG/0.4ML SUBQ injection 40 mg, 40 mg, Subcutaneous, Daily    acetaminophen (Tylenol Extra Strength) tab 500 mg, 500 mg, Oral, Q4H PRN    glucose (Dex4) 15 GM/59ML oral liquid 15 g, 15 g, Oral, Q15 Min PRN **OR** glucose (Glutose) 40% oral gel 15 g, 15 g, Oral, Q15 Min PRN **OR** glucose-vitamin C (Dex-4) chewable tab 4 tablet, 4 tablet, Oral, Q15 Min PRN **OR** dextrose 50% injection 50 mL, 50 mL, Intravenous, Q15 Min PRN **OR** glucose (Dex4) 15 GM/59ML oral liquid 30 g, 30 g, Oral, Q15 Min PRN **OR** glucose (Glutose) 40% oral gel 30 g, 30 g, Oral, Q15 Min PRN **OR** glucose-vitamin C (Dex-4) chewable tab 8 tablet, 8 tablet, Oral, Q15 Min PRN    insulin aspart (NovoLOG) 100 Units/mL FlexPen 1-5 Units, 1-5 Units, Subcutaneous, TID CC and HS        Physical Exam:  General: Alert, orientated x3.  Cooperative.  No apparent distress.  Vital Signs:  Blood pressure 123/87, pulse 90, temperature 98.5 °F (36.9 °C), temperature source Oral, resp. rate 16, weight 160 lb 4.4 oz (72.7 kg), SpO2 92%, not currently breastfeeding.   Temp (24hrs), Av.4 °F (36.9 °C), Min:98 °F (36.7 °C), Max:98.6 °F (37 °C)        HEENT: Exam is unremarkable.  Without scleral icterus.  Mucous membranes are moist. PERRLA.  Oropharynx is clear.  Lungs: Clear to auscultation bilaterally. No w/r.  Non labored.   Cardiac: Regular rate and rhythm. No murmur.  Abdomen:  Soft, non-distended, non-tender, with no rebound or guarding.    Extremities:  No lower extremity edema noted.  Without clubbing or cyanosis.    Skin: Normal texture and turgor.  Neurologic: Cranial nerves are grossly  intact.       Labs:        Lab Results   Component Value Date     WBC 10.4 11/29/2024     HGB 14.0 11/29/2024     HCT 40.7 11/29/2024     .0 11/29/2024     CREATSERUM 0.82 11/29/2024     BUN 17 11/29/2024      11/29/2024     K 4.3 11/29/2024      11/29/2024     CO2 25.0 11/29/2024      11/29/2024     CA 10.7 11/29/2024     INR 0.96 11/29/2024     MG 2.0 11/29/2024         Radiology:        Assessment/Plan:     1.  Acute hypoxic respiratory failure  -was on oral antibiotics over past few weeks  -admitted with hypoxia and requiring O2 supplementation  -imaging with patchy b/l airspace opacities  -afebrile  -now weaned to RA   -PCT negative  -RVP negative  -strep pneumo and legionella ag negative  -MRSA PCR negative  -s/p bronch on 11/26, cultures with NF,  fungal NGTD, MTB complex negative  -aspergillus negative   -Fungal ab, pneumocystis, blastomyces, histoplasma, coccidiodes all pending  -pulmonary following, on steroids   -following off antibiotics     2. Chronic leukopenia  -HIV neg  -WBC at 10K today (also on steroids)   -afebrile     DISPO:  Continue to follow off antibiotics.  If clinically worsens will empirically add PO antibiotics.   Follow up all pending cultures/labs.  Stable for DC once cleared by pulmonary/primary and can f/u all results as an outpatient.  D/w patient and  bedside.       If you have any questions or concerns please call [facility] at [phone].      WILFREDO Kovacs  11/29/2024  8:20 AM        MEDICATIONS ADMINISTERED IN LAST 1 DAY:  enoxaparin (Lovenox) 40 MG/0.4ML SUBQ injection 40 mg        Date Action Dose Route User    11/29/2024 1020 Given 40 mg Subcutaneous (Left Lower Abdomen) Ruba Humphries, RN          insulin aspart (NovoLOG) 100 Units/mL FlexPen 1-5 Units       Date Action Dose Route User    11/29/2024 0622 Given 1 Units Subcutaneous (Left Lower Abdomen) Nguyen Peacock, GIORGIO    11/28/2024 2040 Given 3 Units Subcutaneous (Left Lower  Abdomen) Nguyen Peacock RN    11/28/2024 1712 Given 3 Units Subcutaneous (Left Lower Abdomen) Mary Lou Baez RN    11/28/2024 1425 Given 1 Units Subcutaneous (Right Lower Abdomen) Mary Lou Baez RN          methylPREDNISolone sodium succinate (Solu-MEDROL) injection 60 mg       Date Action Dose Route User    11/29/2024 1300 Given 60 mg Intravenous Ruba Humphries RN    11/29/2024 0611 Given 60 mg Intravenous Nguyen Peacock RN    11/29/2024 0019 Given 60 mg Intravenous Nguyen Peacock RN    11/28/2024 1713 Given 60 mg Intravenous Mary Lou Baez RN          escitalopram (Lexapro) tab 15 mg       Date Action Dose Route User    11/29/2024 1019 Given 15 mg Oral Ruba Humphries RN            Vitals (last day)       Date/Time Temp Pulse Resp BP SpO2 Weight O2 Device O2 Flow Rate (L/min) [facility]    11/29/24 1145 97.9 °F (36.6 °C) 78 16 132/80 94 % -- -- -- EC    11/29/24 0815 98.5 °F (36.9 °C) 90 16 123/87 92 % -- -- -- EC    11/29/24 0023 98.5 °F (36.9 °C) 84 16 119/73 93 % -- None (Room air) --     11/28/24 2033 -- 118 -- 131/83 93 % -- None (Room air) --     11/28/24 1623 98 °F (36.7 °C) 117 19 103/80 92 % -- None (Room air) --     11/28/24 1140 98.6 °F (37 °C) 96 17 128/78 94 % -- Nasal cannula 1 L/min JK    11/28/24 0800 98.5 °F (36.9 °C) 90 23 126/78 92 % -- Nasal cannula 1 L/min TL    11/28/24 0430 98 °F (36.7 °C) 90 16 109/64 93 % -- Nasal cannula 1 L/min CT    11/28/24 0030 98.9 °F (37.2 °C) 69 17 122/73 95 % -- Nasal cannula 1 L/min CT          CIWA Scores (since admission)       None

## 2024-11-29 NOTE — PLAN OF CARE
Pt received A&Ox4. VSS. RA. Tele. Afebrile. Denies dyspnea. Medications given per MAR. Call light within reach.     Problem: RESPIRATORY - ADULT  Goal: Achieves optimal ventilation and oxygenation  Description: INTERVENTIONS:  - Assess for changes in respiratory status  - Assess for changes in mentation and behavior  - Position to facilitate oxygenation and minimize respiratory effort  - Oxygen supplementation based on oxygen saturation or ABGs  - Provide Smoking Cessation handout, if applicable  - Encourage broncho-pulmonary hygiene including cough, deep breathe, Incentive Spirometry  - Assess the need for suctioning and perform as needed  - Assess and instruct to report SOB or any respiratory difficulty  - Respiratory Therapy support as indicated  - Manage/alleviate anxiety  - Monitor for signs/symptoms of CO2 retention  Outcome: Progressing     Problem: Diabetes/Glucose Control  Goal: Glucose maintained within prescribed range  Description: INTERVENTIONS:  - Monitor Blood Glucose as ordered  - Assess for signs and symptoms of hyperglycemia and hypoglycemia  - Administer ordered medications to maintain glucose within target range  - Assess barriers to adequate nutritional intake and initiate nutrition consult as needed  - Instruct patient on self management of diabetes  Outcome: Progressing

## 2024-11-30 VITALS
TEMPERATURE: 98 F | DIASTOLIC BLOOD PRESSURE: 81 MMHG | HEART RATE: 73 BPM | OXYGEN SATURATION: 94 % | SYSTOLIC BLOOD PRESSURE: 113 MMHG | BODY MASS INDEX: 27 KG/M2 | RESPIRATION RATE: 18 BRPM | WEIGHT: 160.25 LBS

## 2024-11-30 LAB
ANION GAP SERPL CALC-SCNC: 4 MMOL/L (ref 0–18)
BLASTOMYCES ABS QN DID: NEGATIVE
BUN BLD-MCNC: 19 MG/DL (ref 9–23)
CALCIUM BLD-MCNC: 10.2 MG/DL (ref 8.7–10.4)
CHLORIDE SERPL-SCNC: 108 MMOL/L (ref 98–112)
CO2 SERPL-SCNC: 26 MMOL/L (ref 21–32)
CREAT BLD-MCNC: 0.76 MG/DL
EGFRCR SERPLBLD CKD-EPI 2021: 94 ML/MIN/1.73M2 (ref 60–?)
ERYTHROCYTE [DISTWIDTH] IN BLOOD BY AUTOMATED COUNT: 12.1 %
GLUCOSE BLD-MCNC: 110 MG/DL (ref 70–99)
GLUCOSE BLD-MCNC: 128 MG/DL (ref 70–99)
GLUCOSE BLD-MCNC: 138 MG/DL (ref 70–99)
HCT VFR BLD AUTO: 41.8 %
HGB BLD-MCNC: 14 G/DL
MAGNESIUM SERPL-MCNC: 2.2 MG/DL (ref 1.6–2.6)
MCH RBC QN AUTO: 30.9 PG (ref 26–34)
MCHC RBC AUTO-ENTMCNC: 33.5 G/DL (ref 31–37)
MCV RBC AUTO: 92.3 FL
OSMOLALITY SERPL CALC.SUM OF ELEC: 290 MOSM/KG (ref 275–295)
PLATELET # BLD AUTO: 309 10(3)UL (ref 150–450)
POTASSIUM SERPL-SCNC: 4.4 MMOL/L (ref 3.5–5.1)
RBC # BLD AUTO: 4.53 X10(6)UL
SODIUM SERPL-SCNC: 138 MMOL/L (ref 136–145)
WBC # BLD AUTO: 12.7 X10(3) UL (ref 4–11)

## 2024-11-30 RX ORDER — SULFAMETHOXAZOLE AND TRIMETHOPRIM 800; 160 MG/1; MG/1
1 TABLET ORAL
Qty: 12 TABLET | Refills: 1 | Status: SHIPPED | OUTPATIENT
Start: 2024-12-02

## 2024-11-30 RX ORDER — PREDNISONE 20 MG/1
TABLET ORAL
Qty: 60 TABLET | Refills: 0 | Status: SHIPPED | OUTPATIENT
Start: 2024-11-30

## 2024-11-30 RX ORDER — FAMOTIDINE 20 MG/1
20 TABLET, FILM COATED ORAL NIGHTLY
Qty: 30 TABLET | Refills: 0 | Status: SHIPPED | COMMUNITY
Start: 2024-11-30

## 2024-11-30 RX ORDER — ESCITALOPRAM OXALATE 10 MG/1
15 TABLET ORAL DAILY
Status: SHIPPED | COMMUNITY
Start: 2024-11-30

## 2024-11-30 NOTE — PLAN OF CARE
Pt received A&Ox4. VSS. RA. Tele. Afebrile. Denies dyspnea or chest tightness. Medications given per MAR. Call light within reach.     Problem: RESPIRATORY - ADULT  Goal: Achieves optimal ventilation and oxygenation  Description: INTERVENTIONS:  - Assess for changes in respiratory status  - Assess for changes in mentation and behavior  - Position to facilitate oxygenation and minimize respiratory effort  - Oxygen supplementation based on oxygen saturation or ABGs  - Provide Smoking Cessation handout, if applicable  - Encourage broncho-pulmonary hygiene including cough, deep breathe, Incentive Spirometry  - Assess the need for suctioning and perform as needed  - Assess and instruct to report SOB or any respiratory difficulty  - Respiratory Therapy support as indicated  - Manage/alleviate anxiety  - Monitor for signs/symptoms of CO2 retention  Outcome: Progressing     Problem: Diabetes/Glucose Control  Goal: Glucose maintained within prescribed range  Description: INTERVENTIONS:  - Monitor Blood Glucose as ordered  - Assess for signs and symptoms of hyperglycemia and hypoglycemia  - Administer ordered medications to maintain glucose within target range  - Assess barriers to adequate nutritional intake and initiate nutrition consult as needed  - Instruct patient on self management of diabetes  Outcome: Progressing

## 2024-11-30 NOTE — DISCHARGE SUMMARY
[facility] Discharge Summary    Patient ID  Virginia Mark  [id number]  54 year old  1970    Admit date: 2024    Discharge date: 24    Attending: Jeremías Iverson MD     Primary Care Physician: Tressa Hamilton DO      Reason for admission: sob    Discharge condition: stable    Disposition: home    Important follow up:  -PCP within 7 d  -specialists:      -labs:    -radiology:      Additional patient instructions       Discharge med list     Medication List        START taking these medications      predniSONE 20 MG Tabs  Commonly known as: Deltasone  60mg x5d, then 50mg x5d then 40mg x5d then 30mg x 5d, then 20mg x5d, then 10mg daily     sulfamethoxazole-trimethoprim -160 MG Tabs per tablet  Commonly known as: Bactrim DS  Take 1 tablet by mouth Every Monday, Wednesday, and Friday.  Start taking on: 2024            ASK your doctor about these medications      escitalopram 10 MG Tabs  Commonly known as: Lexapro  Take 1 tablet (10 mg total) by mouth daily.     metFORMIN HCl ER (OSM) 1000 MG (OSM) Tb24  Commonly known as: FORTAMET               Where to Get Your Medications        These medications were sent to [id number] - [place], [place]  [address] AT [facility] [address], [phone], [phone]   [facility] [facility] [address]      Phone: [phone]   predniSONE 20 MG Tabs  sulfamethoxazole-trimethoprim -160 MG Tabs per tablet         Discharge Diagnoses:    Abnormal CXR  Acute hypoxic  respiratory failure    Consults:  IP CONSULT TO HOSPITALIST  IP CONSULT TO INFECTIOUS DISEASE  IP CONSULT TO SOCIAL WORK  IP CONSULT TO SOCIAL WORK    Radiology:  CARD ECHO 2D DOPPLER CONTRAST (CPT=93306)    Result Date: 2024  Transthoracic Echocardiogram Name:Virginia Mark Date: 2024 :  1970 Ht:  (65in)  BP: 113 / 79 MRN:  [id number]    Age:  54years    Wt:  (160lb) HR: 83bpm Loc:  [facility]       Gndr: F          BSA: 1.8m^2  Sonographer: Shabana BROWN Ordering:    Kelsi Metz Consulting:  Jaden Sims ---------------------------------------------------------------------------- History/Indications:  Hypoxia. Acute respiratory failure. ---------------------------------------------------------------------------- Procedure information:  A transthoracic complete 2D study was performed. Additional evaluation included M-mode, complete spectral Doppler, and color Doppler.  Patient status:  Inpatient.  Location:  Bedside.    This was a routine study. Transthoracic echocardiography for ventricular function evaluation and assessment of valvular function. Image quality was suboptimal. The study was technically limited due to poor acoustic window availability and body habitus. Intravenous contrast (Definity) was administered to evaluate left ventricular function. ECG rhythm:   Normal sinus ---------------------------------------------------------------------------- Conclusions: 1. Left ventricle: The cavity size was normal. Wall thickness was normal.    Systolic function was normal. The estimated ejection fraction was 55-60%,    by visual assessment. Left ventricular diastolic function parameters were    normal. 2. Right ventricle: Systolic function was normal. Impressions:  No previous study from [facility] was available for comparison. * ---------------------------------------------------------------------------- * Findings: Left ventricle:  The cavity size was normal. Wall thickness was normal. Systolic function was normal. The estimated ejection fraction was 55-60%, by visual assessment. Left ventricular diastolic function parameters were normal. Left atrium:  The left atrial volume was normal. Right ventricle:  The cavity size was normal. Systolic function was normal. Right atrium:  The atrium was normal in size. Mitral valve:  The valve was structurally normal. Leaflet separation was normal.  Doppler:  Transvalvular  velocity was within the normal range. There was no evidence for stenosis. There was no significant regurgitation. Aortic valve:   The valve was trileaflet. The leaflets were mildly calcified. Cusp separation was normal.  Doppler:  Transvalvular velocity was within the normal range. There was no evidence for stenosis. There was no significant regurgitation. Tricuspid valve:  The valve is structurally normal. Leaflet separation was normal.  Doppler:  Transvalvular velocity was within the normal range. There was no evidence for stenosis. There was no significant regurgitation. Pulmonic valve:   The valve is structurally normal. Cusp separation was normal.  Doppler:  Transvalvular velocity was within the normal range. There was no evidence for stenosis. There was trivial regurgitation. Pericardium:   There was no pericardial effusion. Aorta: Aortic root: The aortic root was normal. Ascending aorta: The ascending aorta was normal. Pulmonary arteries: The main pulmonary artery was normal-sized.  Estimated pulmonary artery diastolic pressure was 11mm Hg. Systolic pressure could not be accurately estimated. Systemic veins:  Central venous respirophasic diameter changes are in the normal range (>50%). Inferior vena cava: The IVC was normal-sized. ---------------------------------------------------------------------------- Measurements  Left ventricle                Value         Ref  IVS thickness, ED, PLAX       0.8    cm     0.6 - 0.9  LV ID, ED, PLAX               4.0    cm     3.8 - 5.2  LV ID, ES, PLAX               2.7    cm     2.2 - 3.5  LV PW thickness, ED, PLAX     0.7    cm     0.6 - 0.9  IVS/LV PW ratio, ED, PLAX     1.10          ---------  LV PW/LV ID ratio, ED, PLAX   0.17          ---------  LV ejection fraction          60     %      54 - 74  LV e', lateral                11.6   cm/sec >=10.0  LV E/e', lateral              5             <=13  LV e', medial                 8.8    cm/sec >=7.0  LV E/e', medial                6             ---------  LV e', average                10.2   cm/sec ---------  LV E/e', average              6             <=14  Aortic root                   Value         Ref  Aortic root ID, ED            3.1    cm     2.5 - 4.0  Ascending aorta               Value         Ref  Ascending aorta ID, A-P, ED   3.2    cm     1.9 - 3.5  Left atrium                   Value         Ref  LA volume, S                  31     ml     22 - 52  LA volume/bsa, S              17     ml/m^2 16 - 34  LA volume, ES, 1-p A4C        26     ml     22 - 52  LA volume, ES, 1-p A2C        33     ml     22 - 52  LA volume, ES, A/L            33     ml     ---------  LA volume/bsa, ES, A/L        18     ml/m^2 16 - 34  Mitral valve                  Value         Ref  Mitral E-wave peak velocity   0.56   m/sec  ---------  Mitral A-wave peak velocity   0.47   m/sec  ---------  Mitral E/A ratio, peak        1.2           ---------  Pulmonary artery              Value         Ref  PA pressure, ED, DP           11     mm Hg  ---------  Systemic veins                Value         Ref  Estimated CVP                 3      mm Hg  ---------  Right ventricle               Value         Ref  RV free wall LS, 2D           -17.90 %      ---------  TAPSE, 2D                     1.83   cm     >=1.70  RV s', lateral                10.1   cm/sec >=9.5  Pulmonic valve                Value         Ref  Pulmonic regurg velocity, ED  1.42   m/sec  ---------  Pulmonic regurg gradient, ED  8      mm Hg  --------- Legend: (L)  and  (H)  fabienne values outside specified reference range. ---------------------------------------------------------------------------- Prepared and electronically signed by Jace Harris 11/27/2024 15:58     XR CHEST AP PORTABLE  (CPT=71045)    Result Date: 11/26/2024  PROCEDURE:  XR CHEST AP PORTABLE  (CPT=71045)  TECHNIQUE:  AP chest radiograph was obtained.  COMPARISON:  None.  INDICATIONS:  hypoxia  PATIENT STATED HISTORY:  (As transcribed by Technologist)  Patient offered no additional history at this time.    FINDINGS:  There are extensive bilateral patchy airspace opacities involving the lower lobes, lingula and right middle lobe suspicious for multifocal pneumonia.  Cardiac silhouette is obscured by the opacities.  No alla pulmonary vascular congestion.  Mild bibasilar effusions cannot be excluded.  No significant osseous findings.            CONCLUSION:  Extensive patchy bilateral airspace opacities involving the lower lobes, lingula and right middle lobe.  The appearance is suggestive of multifocal pneumonia.   LOCATION:  [facility]      Dictated by (CST): Zully Ramachandran DO on 11/26/2024 at 12:06 PM     Finalized by (CST): Zully Ramachandran DO on 11/26/2024 at 12:07 PM         Operative reports:  Procedure(s) (LRB):  BRONCHOSCOPY WITH BRONCHOALVEOLAR LAVAGE (N/A)    Hospital course:        Patient is a 54 year old female with PMH sig for generalized anxiety who is directly admitted for workup and evaluation of chest tightness and abnormal CT scan.     Abnormal CXR  Acute hypoxic  respiratory failure  - Xray with patchy bl opacities  - Failed several p.o. antibiotics in the outpatient setting  - CT chest 10/15 showing bilateral peribronchial consolidations and lung nodules  - Possible etiologies include atypical pneumonia fungal pneumonia versus pneumonitis/autoimmune process  - Serology workup pending (thus far neg)  - Status post bronchoscopy 10/26, cultures/studies pending  - Keep off antibiotics per ID for now  - Infectious disease, pulmonologist following  - on IV steroids. Add h2 b  - Wean O2 as tolerated     RASTA  - Continue Lexapro      Day of discharge exam:  Vitals:    11/30/24 1123   BP: 113/81   Pulse: 73   Resp: 18   Temp: 98.4 °F (36.9 °C)     Cleared for dc per pulm, on home O2         Total time coordinating care 35 min      Patient and/or family had opportunity to ask questions and expressed understanding and agreement  with therapeutic plan as outlined         Raoul Lujan Hospitalist  [phone]  Answering Service: [phone]

## 2024-11-30 NOTE — PROGRESS NOTES
[facility]   part of [facility] Infectious Disease  Progress Note    Virginia Mark Patient Status:  Inpatient    1970 MRN [id number]   Location [facility] 4NW-A Attending Jeremías Iverson MD   Hosp Day # 4 PCP Tressa Hamilton,      Subjective:  Patient seen/examined.  Clinical course reviewed since my last exam.  She remains off O2 at rest but becomes SOB with any activity.  No fevers.    Objective:  Blood pressure 113/81, pulse 73, temperature 98.4 °F (36.9 °C), temperature source Oral, resp. rate 18, weight 160 lb 4.4 oz (72.7 kg), SpO2 94%, not currently breastfeeding.    Intake/Output:    Intake/Output Summary (Last 24 hours) at 2024 1259  Last data filed at 2024 1123  Gross per 24 hour   Intake 240 ml   Output 0 ml   Net 240 ml       Physical Exam:  General: Awake, alert, non-tox, NAD.  HEENT:  Oropharynx clear, trachea ML.  Heart: RRR S1S2 no murmurs.  Lungs: Essentially CTA b/l, no rhonchi, rales, wheezes.  Abdomen: Soft, NT/ND.  BS present.  No organomegaly.  Extremity: No edema.  Neurological: No focal deficits.  Derm:  Warm, dry, free from rashes.    Lab Data Review:  Lab Results   Component Value Date    WBC 12.7 2024    HGB 14.0 2024    HCT 41.8 2024    .0 2024    CREATSERUM 0.76 2024    BUN 19 2024     2024    K 4.4 2024     2024    CO2 26.0 2024     2024    CA 10.2 2024    MG 2.2 2024        Cultures:   Blood cultures negative  RVP negative  Bronch studies negative  MRSA negative  Blasto negative  Fungal studies negative thus far  Mycoplasma negative    Radiology:  CONCLUSION:    Extensive patchy bilateral airspace opacities involving the lower lobes, lingula and right middle lobe.  The appearance is suggestive of multifocal pneumonia.      Assessment and Plan:     Acute hypoxic respiratory failure with extensive patchy b/l airspace  opacities noted and requiring supplemental O2  - No improvement with empiric courses of oral antibiotics over the last several weeks  - PCT negative  - RVP negative  - Fungal studies negative thus far  - AFBs negative  - Autoimmune studies unrevealing thus far, PANCA, CANCA negative  - Bronch cultures unrevealing thus far  - Monitoring off antibiotics for now     2.  Chronic leukopenia  - WBCs at 12.7K today and up due to steroids  - HIV negative     3.  Disposition - stable from ID.  Supportive care ongoing.  Continue to observe off all antimicrobials and certainly reach out if any positive cultures/studies or decompensation for start of treatment.  ID will sign off for now.    Abby Sahu DO, [facility] Infectious Disease  [phone]    11/30/2024  12:59 PM

## 2024-11-30 NOTE — PROGRESS NOTES
CC: follow-up hospital admission hypoxia    SUBJECTIVE:  Interval History:    Remains symptomatic with exertion  Off O 2 at rest    OBJECTIVE:  Scheduled Meds:    famotidine  20 mg Oral Nightly    methylPREDNISolone  60 mg Intravenous 4 times per day    escitalopram  15 mg Oral Daily    enoxaparin  40 mg Subcutaneous Daily    insulin aspart  1-5 Units Subcutaneous TID CC and HS     Continuous Infusions:   PRN Meds:   influenza virus vaccine PF    acetaminophen    glucose **OR** glucose **OR** glucose-vitamin C **OR** dextrose **OR** glucose **OR** glucose **OR** glucose-vitamin C    PHYSICAL EXAM  Vital signs: Temp:  [97.6 °F (36.4 °C)-98.7 °F (37.1 °C)] 98.3 °F (36.8 °C)  Pulse:  [71-86] 74  Resp:  [15-20] 20  BP: (107-138)/(61-86) 125/79  SpO2:  [93 %-95 %] 93 %      GENERAL - NAD, AAO  EYES- sclera anicteric,    HENT- normocephalic,    NECK - supple  CV- RRR  RESP - decreased at bases, normal resp effort  EXT- no LE edema   PSYCH - normal mentation/ normal affect    Data Review:   Labs:   Recent Labs   Lab 11/26/24  1433 11/26/24  1441 11/27/24  0436 11/28/24  0426 11/29/24  0524 11/30/24  0659   WBC 6.2  --  4.4 4.9 10.4 12.7*   HGB 14.8  --  13.5 13.6 14.0 14.0   MCV 94.1  --  92.8 93.4 90.0 92.3   .0  --  262.0 244.0 315.0 309.0   INR  --  0.98 0.97 1.01 0.96  --        Recent Labs   Lab 11/26/24  1433 11/27/24  0436 11/28/24  0426 11/29/24  0524 11/30/24  0659    141 139 139 138   K 4.5 4.5 4.4 4.3 4.4    106 106 106 108   CO2 24.0 30.0 28.0 25.0 26.0   BUN 11 12 16 17 19   CREATSERUM 0.82 0.79 0.89 0.82 0.76   CA 9.9 9.7 9.6 10.7* 10.2   MG  --  2.1  --  2.0 2.2   PHOS  --  4.6  --   --   --    GLU 99 104* 105* 148* 138*       Recent Labs   Lab 11/26/24  1433 11/27/24  0436   ALT 15 15   AST 27 22   ALB 4.4 4.2       Recent Labs   Lab 11/29/24  0610 11/29/24  1342 11/29/24  1826 11/29/24  2112 11/30/24  0610   PGLU 141* 122* 148* 171* 128*           ASSESSMENT/PLAN:  Patient is a 54 year  old female with PMH sig for generalized anxiety who is directly admitted for workup and evaluation of chest tightness and abnormal CT scan.     Abnormal CXR  Acute hypoxic  respiratory failure  - Xray with patchy bl opacities  - Failed several p.o. antibiotics in the outpatient setting  - CT chest 10/15 showing bilateral peribronchial consolidations and lung nodules  - Possible etiologies include atypical pneumonia fungal pneumonia versus pneumonitis/autoimmune process  - Serology workup pending (thus far neg)  - Status post bronchoscopy 10/26, cultures/studies pending  - Keep off antibiotics per ID for now  - Infectious disease, pulmonologist following  - on IV steroids. Add h2 b  - Wean O2 as tolerated     RASTA  - Continue Lexapro    Dispo - per pulm  Will continue to follow while hospitalized. Please page me or the on-call hospitalist with questions or concerns.    Raoul Lujan Hospitalist  [phone]  Answering Service: [phone]

## 2024-11-30 NOTE — CM/SW NOTE
Patent requiring oxygen with exertion. Sent updated o2 walk to [facility] in aidin. Await order and documentation.      MD to document AFTER O2 sats  I had a face to face visit with this patient and I evaluated the patient's O2 saturations.  The patient is mobile in their home and is in need of a portable oxygen tank.  The home oxygen will improve the patient's condition.    Addendum 3:55pm: E states home o2 good to go. They will contact patient/family to coordinate delivery. Ok to dispense portable tank. Spoke to RT to request they bring tank to patient's room. Updated RN.    ANDI/CM to remain available for support and/or discharge planning.    CHANDA Lakhani  Discharge Planner  [phone]

## 2024-11-30 NOTE — PLAN OF CARE
Out of bed to the bathroom for bladder needs.O2 sats drops when w/ exertion. Remains on IV steroids. Diminished lunch sounds. Encouraged to use IS.Afebrile,all vital signs stable. All needs attended.

## 2024-11-30 NOTE — PROGRESS NOTES
[facility]    Virginia Mark Patient Status:  Inpatient    1970 MRN [id number]   Location [facility] 4NW-A Attending Jeremías Iverson MD   Hosp Day # 4 PCP Tressa Hamilton, DO     SUBJECTIVE:overall feels better. Still has occasional cough.     OBJECTIVE:  /81 (BP Location: Right arm)   Pulse 73   Temp 98.4 °F (36.9 °C) (Oral)   Resp 18   Wt 160 lb 4.4 oz (72.7 kg)   SpO2 94%   BMI 26.67 kg/m²   O2 requirement: on room air at rest     I/O last 3 completed shifts:  In: -   Out: 600 [Urine:600]  I/O this shift:  In: 240 [P.O.:240]  Out: 0      Current Medications:   Current Facility-Administered Medications:     famotidine (Pepcid) tab 20 mg, 20 mg, Oral, Nightly    methylPREDNISolone sodium succinate (Solu-MEDROL) injection 60 mg, 60 mg, Intravenous, 4 times per day    escitalopram (Lexapro) tab 15 mg, 15 mg, Oral, Daily    influenza virus trivalent PF (Fluzone Trivalent) 0.5 mL IM injection (ages 6 months to 64 years) 0.5 mL, 0.5 mL, Intramuscular, Prior to discharge    enoxaparin (Lovenox) 40 MG/0.4ML SUBQ injection 40 mg, 40 mg, Subcutaneous, Daily    acetaminophen (Tylenol Extra Strength) tab 500 mg, 500 mg, Oral, Q4H PRN    glucose (Dex4) 15 GM/59ML oral liquid 15 g, 15 g, Oral, Q15 Min PRN **OR** glucose (Glutose) 40% oral gel 15 g, 15 g, Oral, Q15 Min PRN **OR** glucose-vitamin C (Dex-4) chewable tab 4 tablet, 4 tablet, Oral, Q15 Min PRN **OR** dextrose 50% injection 50 mL, 50 mL, Intravenous, Q15 Min PRN **OR** glucose (Dex4) 15 GM/59ML oral liquid 30 g, 30 g, Oral, Q15 Min PRN **OR** glucose (Glutose) 40% oral gel 30 g, 30 g, Oral, Q15 Min PRN **OR** glucose-vitamin C (Dex-4) chewable tab 8 tablet, 8 tablet, Oral, Q15 Min PRN    insulin aspart (NovoLOG) 100 Units/mL FlexPen 1-5 Units, 1-5 Units, Subcutaneous, TID CC and HS     General appearance: alert, appears stated age, and cooperative  Lungs:  scattered rhonchi bilaterally  Heart: regular rate and rhythm  Abdomen: soft,  non-tender; bowel sounds normal; no masses,  no organomegaly  Extremities: extremities normal, atraumatic, no cyanosis or edema     Lab Results   Component Value Date    WBC 12.7 11/30/2024    RBC 4.53 11/30/2024    HGB 14.0 11/30/2024    HCT 41.8 11/30/2024    MCV 92.3 11/30/2024    MCH 30.9 11/30/2024    MCHC 33.5 11/30/2024    RDW 12.1 11/30/2024    .0 11/30/2024     Lab Results   Component Value Date     11/30/2024    K 4.4 11/30/2024     11/30/2024    CO2 26.0 11/30/2024    BUN 19 11/30/2024    CREATSERUM 0.76 11/30/2024     11/30/2024    CA 10.2 11/30/2024     Lab Results   Component Value Date    INR 0.96 11/29/2024    INR 1.01 11/28/2024    INR 0.97 11/27/2024          Imaging: CXR: bilateral extensive lower lobe consolidations     Assessment/Plan:  Acute hypoxic resp failure - unclear etiology. Due to ILD/pneumonitis- DDx: BOOP/ vs HP -   - weaned to room air at rest; check with ambulation in preparation for poss dc home soon  - improving on steroid burst  - bronchial hygiene  - I had a face to face visit with this patient and I evaluated the patient's O2 saturations.  The patient is mobile in their home and is in need of a portable oxygen tank.  The home oxygen will improve the patient's condition.   Multifocal pneumonia - symptom onset 2 month ago. Outpatient CT scan done 10/15/24 shows bilateral peribronchial consolidations and lung nodules. Subsequent CXR's have worsened.   -ID consulted - holding on abx (treated as outpatient)- signed off now  -s/p bronch BAL cultures in process but so far negative- unfortunately, no TBBx obtained  -lab testing to r/o autoimmune disease and fungal disease (serologies, ANCA, HIV all negative, autoimmune panel pending)  -started on empiric steroids and feels better  -transition to prolonged po steroid taper starting at 60 mg (reduce q5days) once discharged. Cont IV steroids while inhouse  - Bactrim MWF for PJP prophylaxis while on pred >20mg  daily  Dispo - Full code  - OK to dc home from pulm perspective  - d/w family    Robert De Oliveira MD  11/30/2024  1:54 PM

## 2024-11-30 NOTE — PLAN OF CARE
Problem: RESPIRATORY - ADULT  Goal: Achieves optimal ventilation and oxygenation  Description: INTERVENTIONS:  - Assess for changes in respiratory status  - Assess for changes in mentation and behavior  - Position to facilitate oxygenation and minimize respiratory effort  - Oxygen supplementation based on oxygen saturation or ABGs  - Provide Smoking Cessation handout, if applicable  - Encourage broncho-pulmonary hygiene including cough, deep breathe, Incentive Spirometry  - Assess the need for suctioning and perform as needed  - Assess and instruct to report SOB or any respiratory difficulty  - Respiratory Therapy support as indicated  - Manage/alleviate anxiety  - Monitor for signs/symptoms of CO2 retention  Outcome: Adequate for Discharge     Problem: Diabetes/Glucose Control  Goal: Glucose maintained within prescribed range  Description: INTERVENTIONS:  - Monitor Blood Glucose as ordered  - Assess for signs and symptoms of hyperglycemia and hypoglycemia  - Administer ordered medications to maintain glucose within target range  - Assess barriers to adequate nutritional intake and initiate nutrition consult as needed  - Instruct patient on self management of diabetes  Outcome: Adequate for Discharge       NURSING DISCHARGE NOTE    Discharged Home via Ambulatory.  Accompanied by RN  Belongings Taken by patient/family.    Pt received A&Ox4. VSS. On RA at rest. 1-4L via nasal cannula while ambulating in room. All meds per MAR. IV steroids. PIV saline locked per orders. O2 walk completed, MD and social work notified of results. Home O2 order in, RT dropped off portable tank. Family at bedside. Isolation precautions maintained. Call light within reach.    1630: pt cleared for discharge. AVS gone over with all questions answered. PIV removed.

## 2024-11-30 NOTE — PROGRESS NOTES
Oxygen Walk results:      SPO2% on Room Air at Rest: 97 (11/30/24 1453)  SPO2% on Oxygen at Rest: 97 (11/30/24 1453)  At rest oxygen flow (liters per minute): 0 (11/30/24 1453)  SPO2% Ambulation on Room Air: 83 (11/30/24 1453)  SPO2% Ambulation on Oxygen: 95 (11/30/24 1453)  Ambulation oxygen flow (liters per minute): 3 (11/30/24 1453).

## 2024-12-02 LAB
FUNGITELL RESULT: NEGATIVE
FUNGITELL RESULT: NEGATIVE
FUNGITELL VALUE: <31.25 PG/ML
FUNGITELL VALUE: <31.25 PG/ML
UR GALACT AG SCR: NEGATIVE

## 2024-12-02 NOTE — PAYOR COMM NOTE
--------------  DISCHARGE REVIEW    Payor: [facility] OUT OF STATE PPO  Subscriber #:  [id number]  Authorization Number: [id number]    Admit date: 11/26/24  Admit time:  10:24 AM  Discharge Date: 11/30/2024  5:36 PM     Admitting Physician: Jeremías Iverson MD  Attending Physician:  No att. providers found  Primary Care Physician: Tressa Hamilton DO          Discharge Summary Notes        Discharge Summary signed by Raoul Heard DO at 11/30/2024  4:13 PM       Author: Raoul Heard DO Specialty: Internal Medicine Author Type: Physician    Filed: 11/30/2024  4:13 PM Date of Service: 11/30/2024  4:12 PM Status: Signed    : Raoul Heard DO (Physician)         [facility] Discharge Summary    Patient ID  Virginia Mark  [id number]  54 year old  5/4/1970    Admit date: 11/26/2024    Discharge date: 11/30/24    Attending: Jeremías Iverson MD     Primary Care Physician: Tressa Hamilton DO      Reason for admission: sob    Discharge condition: stable    Disposition: home    Important follow up:  -PCP within 7 d  -specialists:      -labs:    -radiology:      Additional patient instructions       Discharge med list     Medication List        START taking these medications      predniSONE 20 MG Tabs  Commonly known as: Deltasone  60mg x5d, then 50mg x5d then 40mg x5d then 30mg x 5d, then 20mg x5d, then 10mg daily     sulfamethoxazole-trimethoprim -160 MG Tabs per tablet  Commonly known as: Bactrim DS  Take 1 tablet by mouth Every Monday, Wednesday, and Friday.  Start taking on: December 2, 2024            ASK your doctor about these medications      escitalopram 10 MG Tabs  Commonly known as: Lexapro  Take 1 tablet (10 mg total) by mouth daily.     metFORMIN HCl ER (OSM) 1000 MG (OSM) Tb24  Commonly known as: FORTAMET               Where to Get Your Medications        These medications were sent to [id number] [place], [place] - [address] AT [facility] [address],  [phone], [phone]  [address] [facility] [address]      Phone: [phone]   predniSONE 20 MG Tabs  sulfamethoxazole-trimethoprim -160 MG Tabs per tablet         Discharge Diagnoses:    Abnormal CXR  Acute hypoxic  respiratory failure    Consults:  IP CONSULT TO HOSPITALIST  IP CONSULT TO INFECTIOUS DISEASE  IP CONSULT TO SOCIAL WORK  IP CONSULT TO SOCIAL WORK    Radiology:  CARD ECHO 2D DOPPLER CONTRAST (CPT=93306)    Result Date: 2024  Transthoracic Echocardiogram Name:Virginia Mark Date: 2024 :  1970 Ht:  (65in)  BP: 113 / 79 MRN:  [id number]    Age:  54years    Wt:  (160lb) HR: 83bpm Loc:  [facility]       Gndr: F          BSA: 1.8m^2 Sonographer: Shabana BROWN Ordering:    Kelsi Metz Consulting:  Jaden Sims ---------------------------------------------------------------------------- History/Indications:  Hypoxia. Acute respiratory failure. ---------------------------------------------------------------------------- Procedure information:  A transthoracic complete 2D study was performed. Additional evaluation included M-mode, complete spectral Doppler, and color Doppler.  Patient status:  Inpatient.  Location:  Bedside.    This was a routine study. Transthoracic echocardiography for ventricular function evaluation and assessment of valvular function. Image quality was suboptimal. The study was technically limited due to poor acoustic window availability and body habitus. Intravenous contrast (Definity) was administered to evaluate left ventricular function. ECG rhythm:   Normal sinus ---------------------------------------------------------------------------- Conclusions: 1. Left ventricle: The cavity size was normal. Wall thickness was normal.    Systolic function was normal. The estimated ejection fraction was 55-60%,    by visual assessment. Left ventricular diastolic function parameters were    normal. 2. Right ventricle: Systolic function was normal.  Impressions:  No previous study from [facility] was available for comparison. * ---------------------------------------------------------------------------- * Findings: Left ventricle:  The cavity size was normal. Wall thickness was normal. Systolic function was normal. The estimated ejection fraction was 55-60%, by visual assessment. Left ventricular diastolic function parameters were normal. Left atrium:  The left atrial volume was normal. Right ventricle:  The cavity size was normal. Systolic function was normal. Right atrium:  The atrium was normal in size. Mitral valve:  The valve was structurally normal. Leaflet separation was normal.  Doppler:  Transvalvular velocity was within the normal range. There was no evidence for stenosis. There was no significant regurgitation. Aortic valve:   The valve was trileaflet. The leaflets were mildly calcified. Cusp separation was normal.  Doppler:  Transvalvular velocity was within the normal range. There was no evidence for stenosis. There was no significant regurgitation. Tricuspid valve:  The valve is structurally normal. Leaflet separation was normal.  Doppler:  Transvalvular velocity was within the normal range. There was no evidence for stenosis. There was no significant regurgitation. Pulmonic valve:   The valve is structurally normal. Cusp separation was normal.  Doppler:  Transvalvular velocity was within the normal range. There was no evidence for stenosis. There was trivial regurgitation. Pericardium:   There was no pericardial effusion. Aorta: Aortic root: The aortic root was normal. Ascending aorta: The ascending aorta was normal. Pulmonary arteries: The main pulmonary artery was normal-sized.  Estimated pulmonary artery diastolic pressure was 11mm Hg. Systolic pressure could not be accurately estimated. Systemic veins:  Central venous respirophasic diameter changes are in the normal range (>50%). Inferior vena cava: The IVC was normal-sized.  ---------------------------------------------------------------------------- Measurements  Left ventricle                Value         Ref  IVS thickness, ED, PLAX       0.8    cm     0.6 - 0.9  LV ID, ED, PLAX               4.0    cm     3.8 - 5.2  LV ID, ES, PLAX               2.7    cm     2.2 - 3.5  LV PW thickness, ED, PLAX     0.7    cm     0.6 - 0.9  IVS/LV PW ratio, ED, PLAX     1.10          ---------  LV PW/LV ID ratio, ED, PLAX   0.17          ---------  LV ejection fraction          60     %      54 - 74  LV e', lateral                11.6   cm/sec >=10.0  LV E/e', lateral              5             <=13  LV e', medial                 8.8    cm/sec >=7.0  LV E/e', medial               6             ---------  LV e', average                10.2   cm/sec ---------  LV E/e', average              6             <=14  Aortic root                   Value         Ref  Aortic root ID, ED            3.1    cm     2.5 - 4.0  Ascending aorta               Value         Ref  Ascending aorta ID, A-P, ED   3.2    cm     1.9 - 3.5  Left atrium                   Value         Ref  LA volume, S                  31     ml     22 - 52  LA volume/bsa, S              17     ml/m^2 16 - 34  LA volume, ES, 1-p A4C        26     ml     22 - 52  LA volume, ES, 1-p A2C        33     ml     22 - 52  LA volume, ES, A/L            33     ml     ---------  LA volume/bsa, ES, A/L        18     ml/m^2 16 - 34  Mitral valve                  Value         Ref  Mitral E-wave peak velocity   0.56   m/sec  ---------  Mitral A-wave peak velocity   0.47   m/sec  ---------  Mitral E/A ratio, peak        1.2           ---------  Pulmonary artery              Value         Ref  PA pressure, ED, DP           11     mm Hg  ---------  Systemic veins                Value         Ref  Estimated CVP                 3      mm Hg  ---------  Right ventricle               Value         Ref  RV free wall LS, 2D           -17.90 %      ---------  TAPSE, 2D                      1.83   cm     >=1.70  RV s', lateral                10.1   cm/sec >=9.5  Pulmonic valve                Value         Ref  Pulmonic regurg velocity, ED  1.42   m/sec  ---------  Pulmonic regurg gradient, ED  8      mm Hg  --------- Legend: (L)  and  (H)  fabienne values outside specified reference range. ---------------------------------------------------------------------------- Prepared and electronically signed by Jace Harris 11/27/2024 15:58     XR CHEST AP PORTABLE  (CPT=71045)    Result Date: 11/26/2024  PROCEDURE:  XR CHEST AP PORTABLE  (CPT=71045)  TECHNIQUE:  AP chest radiograph was obtained.  COMPARISON:  None.  INDICATIONS:  hypoxia  PATIENT STATED HISTORY: (As transcribed by Technologist)  Patient offered no additional history at this time.    FINDINGS:  There are extensive bilateral patchy airspace opacities involving the lower lobes, lingula and right middle lobe suspicious for multifocal pneumonia.  Cardiac silhouette is obscured by the opacities.  No alla pulmonary vascular congestion.  Mild bibasilar effusions cannot be excluded.  No significant osseous findings.            CONCLUSION:  Extensive patchy bilateral airspace opacities involving the lower lobes, lingula and right middle lobe.  The appearance is suggestive of multifocal pneumonia.   LOCATION:  [facility]      Dictated by (CST): Zully Ramachandran DO on 11/26/2024 at 12:06 PM     Finalized by (CST): Zully Ramachandran DO on 11/26/2024 at 12:07 PM         Operative reports:  Procedure(s) (LRB):  BRONCHOSCOPY WITH BRONCHOALVEOLAR LAVAGE (N/A)    Hospital course:        Patient is a 54 year old female with PMH sig for generalized anxiety who is directly admitted for workup and evaluation of chest tightness and abnormal CT scan.     Abnormal CXR  Acute hypoxic  respiratory failure  - Xray with patchy bl opacities  - Failed several p.o. antibiotics in the outpatient setting  - CT chest 10/15 showing bilateral peribronchial consolidations and  lung nodules  - Possible etiologies include atypical pneumonia fungal pneumonia versus pneumonitis/autoimmune process  - Serology workup pending (thus far neg)  - Status post bronchoscopy 10/26, cultures/studies pending  - Keep off antibiotics per ID for now  - Infectious disease, pulmonologist following  - on IV steroids. Add h2 b  - Wean O2 as tolerated     RASTA  - Continue Lexapro      Day of discharge exam:  Vitals:    11/30/24 1123   BP: 113/81   Pulse: 73   Resp: 18   Temp: 98.4 °F (36.9 °C)     Cleared for dc per pulm, on home O2         Total time coordinating care 35 min      Patient and/or family had opportunity to ask questions and expressed understanding and agreement with therapeutic plan as outlined         Raoul Lujan Hospitalist  [phone]  Answering Service: [phone]      Electronically signed by Raoul Heard DO on 11/30/2024  4:13 PM         REVIEWER COMMENTS

## 2024-12-03 LAB
BLASTOMYCES AG INTERP: NEGATIVE
BLASTOMYCES AG INTERP: NEGATIVE
NON GYNE INTERPRETATION: NEGATIVE

## 2024-12-06 LAB
COCCIDIOIDES AB (ID): NOT DETECTED
COCCIDIOIDES AB, IGG: 0.2 IV
COCCIDIOIDES AB, IGM: 0.2 IV

## 2024-12-21 LAB
ANTI-EJ: NEGATIVE
ANTI-JO-1: <20 UNITS
ANTI-KU: NEGATIVE
ANTI-MDA-5: <20 UNITS
ANTI-MI-2 AB: NEGATIVE
ANTI-NXP-2: <20 UNITS
ANTI-OJ: NEGATIVE
ANTI-PL-12: NEGATIVE
ANTI-PL-7: NEGATIVE
ANTI-PM/SCL100: <20 UNITS
ANTI-SAE1: <20 UNITS
ANTI-SRP AB: NEGATIVE
ANTI-SSA 52KD IGG: 22 UNITS
ANTI-TIF-1GAMMA: <20 UNITS
ANTI-U1 RNP: <20 UNITS
ANTI-U2 RNP: NEGATIVE
ANTI-U3 RNP: NEGATIVE

## (undated) DEVICE — 1200CC GUARDIAN II: Brand: GUARDIAN

## (undated) DEVICE — SINGLE USE BIOPSY VALVE MAJ-210: Brand: SINGLE USE BIOPSY VALVE (STERILE)

## (undated) DEVICE — MEDI-VAC NON-CONDUCTIVE SUCTION TUBING: Brand: CARDINAL HEALTH

## (undated) DEVICE — GLOVE SUR 7.5 SENSICARE PIP WHT PWD F

## (undated) DEVICE — 60 ML SYRINGE REGULAR TIP: Brand: MONOJECT

## (undated) DEVICE — 3M™ RED DOT™ MONITORING ELECTRODE WITH FOAM TAPE AND STICKY GEL, 50/BAG, 20/CASE, 72/PLT 2570: Brand: RED DOT™

## (undated) DEVICE — SYRINGE MED 10ML SLIP TIP CLR BRL TAPR PLUNG

## (undated) DEVICE — SINGLE USE SUCTION VALVE MAJ-209: Brand: SINGLE USE SUCTION VALVE (STERILE)

## (undated) DEVICE — KIT CUSTOM ENDOPROCEDURE STERIS

## (undated) DEVICE — MEDI-VAC SUCTION HANDLE REGULAR CAPACITY: Brand: CARDINAL HEALTH

## (undated) NOTE — LETTER
[address]  [address]  Authorization for Surgical Operation and Procedure     Date:___________                                                                                                         Time:__________  I hereby authorize Surgeon(s):  Chuck Justice MD, my physician and his/her assistants (if applicable), which may include medical students, residents, and/or fellows, to perform the following surgical operation/ procedure and administer such anesthesia as may be determined necessary by my physician:  Operation/Procedure name (s) Procedure(s):  BRONCHOSCOPY WITH BRONCHOALVEOLAR LAVAGE on Virginia Mark   2.   I recognize that during the surgical operation/procedure, unforeseen conditions may necessitate additional or different procedures than those listed above.  I, therefore, further authorize and request that the above-named surgeon, assistants, or designees perform such procedures as are, in their judgment, necessary and desirable.    3.   My surgeon/physician has discussed prior to my surgery the potential benefits, risks and side effects of this procedure; the likelihood of achieving goals; and potential problems that might occur during recuperation.  They also discussed reasonable alternatives to the procedure, including risks, benefits, and side effects related to the alternatives and risks related to not receiving this procedure.  I have had all my questions answered and I acknowledge that no guarantee has been made as to the result that may be obtained.    4.   Should the need arise during my operation/procedure, which includes change of level of care prior to discharge, I also consent to the administration of blood and/or blood products.  Further, I understand that despite careful testing and screening of blood or blood products by collecting agencies, I may still be subject to ill effects as a result of receiving a blood transfusion and/or blood  products.  The following are some, but not all, of the potential risks that can occur: fever and allergic reactions, hemolytic reactions, transmission of diseases such as Hepatitis, AIDS and Cytomegalovirus (CMV) and fluid overload.  In the event that I wish to have an autologous transfusion of my own blood, or a directed donor transfusion, I will discuss this with my physician.  Check only if Refusing Blood or Blood Products  I understand refusal of blood or blood products as deemed necessary by my physician may have serious consequences to my condition to include possible death. I hereby assume responsibility for my refusal and release the hospital, its personnel, and my physicians from any responsibility for the consequences of my refusal.          o  Refuse      5.   I authorize the use of any specimen, organs, tissues, body parts or foreign objects that may be removed from my body during the operation/procedure for diagnosis, research or teaching purposes and their subsequent disposal by hospital authorities.  I also authorize the release of specimen test results and/or written reports to my treating physician on the hospital medical staff or other referring or consulting physicians involved in my care, at the discretion of the Pathologist or my treating physician.    6.   I consent to the photographing or videotaping of the operations or procedures to be performed, including appropriate portions of my body for medical, scientific, or educational purposes, provided my identity is not revealed by the pictures or by descriptive texts accompanying them.  If the procedure has been photographed/videotaped, the surgeon will obtain the original picture, image, videotape or CD.  The hospital will not be responsible for storage, release or maintenance of the picture, image, tape or CD.    7.   I consent to the presence of a  or observers in the operating room as deemed necessary by my physician or  their designees.    8.   I recognize that in the event my procedure results in extended X-Ray/fluoroscopy time, I may develop a skin reaction.    9. If I have a Do Not Attempt Resuscitation (DNAR) order in place, that status will be suspended while in the operating room, procedural suite, and during the recovery period unless otherwise explicitly stated by me (or a person authorized to consent on my behalf). The surgeon or my attending physician will determine when the applicable recovery period ends for purposes of reinstating the DNAR order.  10. Patients having a sterilization procedure: I understand that if the procedure is successful the results will be permanent and it will therefore be impossible for me to inseminate, conceive, or bear children.  I also understand that the procedure is intended to result in sterility, although the result has not been guaranteed.   11. I acknowledge that my physician has explained sedation/analgesia administration to me including the risk and benefits I consent to the administration of sedation/analgesia as may be necessary or desirable in the judgment of my physician.    I CERTIFY THAT I HAVE READ AND FULLY UNDERSTAND THE ABOVE CONSENT TO OPERATION and/or OTHER PROCEDURE.    _________________________________________  __________________________________  Signature of Patient     Signature of Responsible Person         ___________________________________         Printed Name of Responsible Person           _________________________________                 Relationship to Patient  _________________________________________  ______________________________  Signature of Witness          Date  Time      Patient Name: Virginia Mark     : 1970                 Printed: 2024     Medical Record #: [id number]                     Page [address] of [address] [place] [address]  [address]    Consent for Anesthesia    Virginia BRITT  LELIA Jas agree to be cared for by an anesthesiologist, who is specially trained to monitor me and give me medicine to put me to sleep or keep me comfortable during my procedure    I understand that my anesthesiologist is not an employee or agent of [facility] [employer]. He or she works for [employer].    As the patient asking for anesthesia services, I agree to:  Allow the anesthesiologist (anesthesia doctor) to give me medicine and do additional procedures as necessary. Some examples are: Starting or using an “IV” to give me medicine, fluids or blood during my procedure, and having a breathing tube placed to help me breathe when I’m asleep (intubation). In the event that my heart stops working properly, I understand that my anesthesiologist will make every effort to sustain my life, unless otherwise directed by [facility] Do Not Resuscitate documents.  Tell my anesthesia doctor before my procedure:  If I am pregnant.  The last time that I ate or drank.  All of the medicines I take (including prescriptions, herbal supplements, and pills I can buy without a prescription (including street drugs/illegal medications). Failure to inform my anesthesiologist about these medicines may increase my risk of anesthetic complications.  If I am allergic to anything or have had a reaction to anesthesia before.  I understand how the anesthesia medicine will help me (benefits).  I understand that with any type of anesthesia medicine there are risks:  The most common risks are: nausea, vomiting, sore throat, muscle soreness, damage to my eyes, mouth, or teeth (from breathing tube placement).  Rare risks include: remembering what happened during my procedure, allergic reactions to medications, injury to my airway, heart, lungs, vision, nerves, or muscles and in extremely rare instances death.  My doctor has explained to me other choices available to me for my care (alternatives).  Pregnant  Patients (“epidural”):  I understand that the risks of having an epidural (medicine given into my back to help control pain during labor), include itching, low blood pressure, difficulty urinating, headache or slowing of the baby’s heart. Very rare risks include infection, bleeding, seizure, irregular heart rhythms and nerve injury.  Regional Anesthesia (“spinal”, “epidural”, & “nerve blocks”):  I understand that rare but potential complications include headache, bleeding, infection, seizure, irregular heart rhythms, and nerve injury.    I can change my mind about having anesthesia services at any time before I get the medicine.    _____________________________________________________________________________  Patient (or Representative) Signature/Relationship to Patient  Date   Time    _____________________________________________________________________________   Name (if used)    Language/Organization   Time    _____________________________________________________________________________  Anesthesiologist Signature     Date   Time  I have discussed the procedure and information above with the patient (or patient’s representative) and answered their questions. The patient or their representative has agreed to have anesthesia services.    _____________________________________________________________________________  Witness        Date   Time  I have verified that the signature is that of the patient or patient’s representative, and that it was signed before the procedure  Patient Name: Virginia Mark     : 1970                 Printed: 2024     Medical Record #: [id number]                     Page 2 of 2